# Patient Record
Sex: FEMALE | Race: WHITE | NOT HISPANIC OR LATINO | Employment: FULL TIME | URBAN - NONMETROPOLITAN AREA
[De-identification: names, ages, dates, MRNs, and addresses within clinical notes are randomized per-mention and may not be internally consistent; named-entity substitution may affect disease eponyms.]

---

## 2018-03-29 ENCOUNTER — OFFICE VISIT CONVERTED (OUTPATIENT)
Dept: FAMILY MEDICINE CLINIC | Age: 28
End: 2018-03-29
Attending: NURSE PRACTITIONER

## 2019-08-08 ENCOUNTER — OFFICE VISIT CONVERTED (OUTPATIENT)
Dept: FAMILY MEDICINE CLINIC | Age: 29
End: 2019-08-08
Attending: NURSE PRACTITIONER

## 2020-06-16 ENCOUNTER — HOSPITAL ENCOUNTER (OUTPATIENT)
Dept: OTHER | Facility: HOSPITAL | Age: 30
Discharge: HOME | End: 2020-06-16
Attending: NURSE PRACTITIONER

## 2020-06-16 ENCOUNTER — OFFICE VISIT CONVERTED (OUTPATIENT)
Dept: FAMILY MEDICINE CLINIC | Age: 30
End: 2020-06-16
Attending: NURSE PRACTITIONER

## 2020-06-18 LAB
AMOXICILLIN+CLAV SUSC ISLT: 4
AMPICILLIN SUSC ISLT: 8
AMPICILLIN+SULBAC SUSC ISLT: 4
BACTERIA UR CULT: ABNORMAL
CEFAZOLIN SUSC ISLT: <=4
CEFEPIME SUSC ISLT: <=1
CEFTAZIDIME SUSC ISLT: <=1
CEFTRIAXONE SUSC ISLT: <=1
CEFUROXIME ORAL SUSC ISLT: 8
CEFUROXIME PARENTER SUSC ISLT: 8
CIPROFLOXACIN SUSC ISLT: <=0.25
ERTAPENEM SUSC ISLT: <=0.5
GENTAMICIN SUSC ISLT: <=1
LEVOFLOXACIN SUSC ISLT: <=0.12
NITROFURANTOIN SUSC ISLT: <=16
TETRACYCLINE SUSC ISLT: 2
TMP SMX SUSC ISLT: <=20
TOBRAMYCIN SUSC ISLT: <=1

## 2020-07-07 ENCOUNTER — OFFICE VISIT CONVERTED (OUTPATIENT)
Dept: SURGERY | Facility: CLINIC | Age: 30
End: 2020-07-07
Attending: SURGERY

## 2020-07-15 ENCOUNTER — OFFICE VISIT CONVERTED (OUTPATIENT)
Dept: FAMILY MEDICINE CLINIC | Age: 30
End: 2020-07-15
Attending: NURSE PRACTITIONER

## 2020-08-07 ENCOUNTER — HOSPITAL ENCOUNTER (OUTPATIENT)
Dept: OTHER | Facility: HOSPITAL | Age: 30
Discharge: HOME | End: 2020-08-07
Attending: NURSE PRACTITIONER

## 2020-08-07 ENCOUNTER — OFFICE VISIT CONVERTED (OUTPATIENT)
Dept: FAMILY MEDICINE CLINIC | Age: 30
End: 2020-08-07
Attending: NURSE PRACTITIONER

## 2020-08-10 LAB — SARS-COV-2 RNA SPEC QL NAA+PROBE: NOT DETECTED

## 2020-12-04 ENCOUNTER — OFFICE VISIT CONVERTED (OUTPATIENT)
Dept: FAMILY MEDICINE CLINIC | Age: 30
End: 2020-12-04
Attending: NURSE PRACTITIONER

## 2021-05-10 NOTE — H&P
History and Physical      Patient Name: Marissa Brantley   Patient ID: [id number]   Sex: Female   YOB: 1990    Primary Care Provider: Jocy Raza MD   Referring Provider: Precious HUDDLESTON    Visit Date: July 7, 2020    Provider: Demar Ballard MD   Location: [facility]   Location Address: [address] [place] [address]  [id number]   Location Phone: [phone]          Chief Complaint  · Hemorrhoids      History Of Present Illness  Marissa Brantley is a 29 year old /White female who presents to the office today as a consult from Precious HUDDLESTON.      Patient schedule an appoint to see me regarding hemorrhoids.  The first time she ever had a problem with hemorrhoids was about a week ago when she had a swollen tender area at her anus that is since resolved.  A few days later she developed another tender swollen area at another area of her anus that is still painful but the pain is improving.  The only thing the patient has tried so far is Preparation H.       Past Medical History  Disease Name Date Onset Notes   Allergic rhinitis, chronic --  --    Arthritis --  --          Past Surgical History  Procedure Name Date Notes   Cesarian Section --  --    Cholecystectomy --  --    Wrist Surgery --  --          Medication List  Name Date Started Instructions   hydrocortisone 2.5 % topical cream with perineal applicator 07/07/2020 apply a thin layer to the affected area(s) by topical route 4 times per day for 14 days   lidocaine 5 % topical ointment 07/07/2020 apply to affected area(s) by topical route 1-4 times daily as needed for 14 days   Macrodantin 100 mg oral capsule  one po bid   pramoxine 1 % topical foam 07/07/2020 apply by external route 4 times a day for 14 days       Allergies Reconciled  Family Medical History  Disease Name Relative/Age Notes   Diabetes, unspecified type Father/   Father   Prostate cancer Grandfather (paternal)/   Grandfather (paternal)   Bladder  "calculus Sister/   Sister         Social History  Finding Status Start/Stop Quantity Notes   Alcohol Current some day --/-- --  drinks rarely   Caffeine Current some day --/-- --  drinks occasionally; tea and soft drinks   Second hand smoke exposure Current some day --/-- --  yes   Tobacco Current every day 15/-- --  09/12/2017 - current everyday smoker; started smoking at age 15; smoked 10 cigarette(s) per day         Review of Systems  · Constitutional  o Denies  o : fever, chills  · Cardiovascular  o Denies  o : chest pain on exertion  · Respiratory  o Denies  o : shortness of breath, cough  · Gastrointestinal  o Denies  o : nausea, vomiting      Vitals  Date Time BP Position Site L\R Cuff Size HR RR TEMP (F) WT  HT  BMI kg/m2 BSA m2 O2 Sat        07/07/2020 08:57 AM       14  215lbs 4oz 5'  5\" 35.82 2.12           Physical Examination  · Constitutional  o Appearance  o : healthy appearing, alert and in no acute distress, reliable historian  · Head and Face  o Head  o :   § Inspection  § : no visable deformities or lesions  · Eyes  o Conjunctivae  o : clear  o Sclerae  o : clear  · Neck  o Inspection/Palpation  o : normal appearance, no masses, trachea midline  · Respiratory  o Respiratory Effort  o : breathing unlabored, respiratory effort appears normal  o Inspection of Chest  o : normal appearance, no retractions  · Cardiovascular  o Heart  o : regular rate and rhythm  · Gastrointestinal  o Abdominal Examination  o :   § Abdomen  § : soft, nontender, nondistended  · Skin and Subcutaneous Tissue  o General Inspection  o : no visible concerning rashes or lesions present  · Neurologic  o Cranial Nerves  o : no obvious motor deficits  o Sensation  o : no obvious sensory deficits  o Gait and Station  o :   § Gait Screening  § : normal gait, able to stand without diffculty  o Cerebellar Function  o : no obvious abnormalities  · Psychiatric  o Judgement and Insight  o : judgment and insight intact  o Mood and " Affect  o : mood normal, affect appropriate     Anal exam performed with Em in the room as a chaperone.  There is a soft external hemorrhoid at the left lateral location and the hemorrhoid is only mildly tender.           Assessment  · External hemorrhoid     455.3/K64.4    Problems Reconciled  Plan  · Medications  o Medications have been Reconciled  o Transition of Care or Provider Policy  · Referrals  o ID: [id number] Date: 07/02/2020 Type: Inbound  Specialty: General Surgery     There is no indication at this time.  Will prescribe lidocaine ointment, hydrocortisone ointment, and pramoxine ointment.             Electronically Signed by: Demar Ballard MD -Author on July 7, 2020 10:01:18 AM

## 2021-05-15 VITALS — RESPIRATION RATE: 14 BRPM | BODY MASS INDEX: 35.86 KG/M2 | HEIGHT: 65 IN | WEIGHT: 215.25 LBS

## 2021-05-18 NOTE — PROGRESS NOTES
Marissa Brantley  1990     Office/Outpatient Visit    Visit Date: Wed, Jul 15, 2020 01:30 pm    Provider: Sintia Guerrero N.P. (Assistant: Keila Roberts MA)    Location: [facility]        Electronically signed by Sintia Guerrero N.P. on  07/15/2020 02:21:07 PM                             Subjective:        CC: Mrs. Brantley is a 29 year old White female.  back pain flare up; negative pregnancy test a week ago, sexually active with new  partner about a month         HPI:           Concerning low back pain, the discomfort is most prominent in the mid lumbar spine.  She characterizes it as constant and sharp.  This is a chronic, but intermittent problem with an acute exacerbation.  She states that the current episode of pain started yesterday.  The event which precipitated this pain was lifting her handicap daughter.  She denies radicular leg pain.  She notes some pain relief with back flexion, rest, and heat.  Muscle relaxers usually help.     ROS:     CONSTITUTIONAL:  Negative for fever.      RESPIRATORY:  Negative for recent cough and dyspnea.      GASTROINTESTINAL:  Negative for change in bowel control.      GENITOURINARY:  Positive for has OCP's but has not started her pills yet.   Negative for dysuria or change in bladder control.  had a pap last week at Dr. Crain's  office     MUSCULOSKELETAL:  Positive for cramps in foot.      NEUROLOGICAL:  Positive for tingling in left foot.          Past Medical History / Family History / Social History:         Last Reviewed on 7/15/2020 02:08 PM by Sintia Guerrero    Past Medical History:                 PAST MEDICAL HISTORY         Osteoarthritis: primarily affecting the right wrist post ORIF;     Chicken pox         GYNECOLOGICAL HISTORY:        Problems with menstrual cycles include irregular frequency/duration.      Current method of contraception is birth control pills;     Menarche occurred at age 14. Sexually Active? yes          CURRENT MEDICAL PROVIDERS:    Obstetrician/Gynecologist    Orthopedist: [facility]         PREVENTIVE HEALTH MAINTENANCE             PAP SMEAR: was last done 2016 with normal results         Surgical History:         Cholecystectomy: laparoscopic; 17; uncomplicated;      section: X 2;     Fracture Repair: right wrist; ;         Family History:     Father: Positive for Benign Prostatic Hypertrophy;     Mother:    Positive for Type 2 Diabetes;     Brother(s): Healthy; 1 brother(s) total     Sister(s): Healthy; 3 sister(s) total     Daughter(s): 2 daughter(s) total    ; Positive for congenital heart defect;         Social History:         Household:  Lives with her lives with significant other and 2 daughters.  Occupation: [employer]     Marital Status:      Children: 2 children         Tobacco/Alcohol/Supplements:     Last Reviewed on 7/15/2020 01:34 PM by Keila Roberts    Tobacco: Current Smoker: She currently smokes every day, 1 pack per day.  Non-drinker         Immunizations:     DTaP 1990    DTaP 1991    DTaP 1991    DTaP 3/5/1992    DTaP 1994    HIB-Mency 1991    HIB-Mency 1991    HIB-Mency 3/5/1992    Hep B (pedi/adol, 3-dose schedule) 2001    Hep B (pedi/adol, 3-dose schedule) 2002    Hep B (pedi/adol, 3-dose schedule) 2002    zzGardasil 2007    zzGardasil 2007    zzGardasil 3/5/2008    IPV  Poliovirus, inactivated 1990    IPV  Poliovirus, inactivated 1991    IPV  Poliovirus, inactivated 3/5/1992    IPV  Poliovirus, inactivated 1994    MMR  (Measles-Mumps-Rubella), live 3/5/1992    MMR  (Measles-Mumps-Rubella), live 2001    FluMist 2010    FluMist 2011    Menactra (Meningococcal MCV4P) 2007    Boostrix (Tdap) 2003        Allergies:     Last Reviewed on 7/15/2020 01:34 PM by Keila Roberts    Prozac:   (Adverse Reaction)        Current Medications:      Last Reviewed on 7/15/2020 01:34 PM by Keila Roberts    Preparation H 0.25-14-74.9 % Rectal Ointment [as directed]        Objective:        Vitals:         Current: 7/15/2020 1:36:13 PM    Ht:  5 ft, 5.375 in;  Wt: 209.4 lbs;  BMI: 34.4T: 97.4 F (oral);  BP: 107/69 mm Hg (right arm, sitting);  P: 100 bpm (right arm (BP Cuff), sitting);  sCr: 0.78 mg/dL;  GFR: 122.67        Exams:     PHYSICAL EXAM:     GENERAL: vital signs recorded - well developed, well nourished;  no apparent distress, seems to be in pain;     RESPIRATORY: normal respiratory rate and pattern with no distress; normal breath sounds with no rales, rhonchi, wheezes or rubs;     CARDIOVASCULAR: normal rate; rhythm is regular;  no systolic murmur; no edema;     MUSCULOSKELETAL: pain with range of motion in: back extension;  no CVA tenderness; SLR negative, equal strength and reflexes bilateral lower ext     PSYCHIATRIC: affect/demeanor: flat;         Assessment:         724.2   Low back pain   (Mild)     M54.5   Low back pain       K64.9   Unspecified hemorrhoids           ORDERS:         Meds Prescribed:       [New Rx] methocarbamoL 750 mg oral tablet [take 1 tablet (750 mg) by oral route 2 times per day prn ], #60 (sixty) tablets, Refills: 0 (zero)       [New Rx] etodolac 400 mg oral tablet [take 1 tablet (400 mg) by oral route 2 times per day with food], #60 (sixty) tablets, Refills: 0 (zero)                 Plan:         Low back pain        RECOMMENDATIONS given include: apply moist heat and off work yesterday and today.      FOLLOW-UP: Advised to call if there is no improvement in 1 week(s).            Prescriptions:       [New Rx] methocarbamoL 750 mg oral tablet [take 1 tablet (750 mg) by oral route 2 times per day prn ], #60 (sixty) tablets, Refills: 0 (zero)       [New Rx] etodolac 400 mg oral tablet [take 1 tablet (400 mg) by oral route 2 times per day with food], #60 (sixty) tablets, Refills: 0 (zero)         Unspecified  hemorrhoidssaw dr jeanette lópez recently  and he has treated her with different rx's for her hemorrhoid            Patient Recommendations:        For  Low back pain:    Try moist heat for pain relief. I also recommend off work yesterday and today.              Charge Capture:         Primary Diagnosis:     724.2  Low back pain           Orders:      70542  Office/outpatient visit; established patient, level 4  (In-House)              M54.5  Low back pain     K64.9  Unspecified hemorrhoids

## 2021-05-18 NOTE — PROGRESS NOTES
Marissa Brantley  1990     Office/Outpatient Visit    Visit Date: Fri, Dec 4, 2020 11:26 am    Provider: Carey Ayoub N.P. (Assistant: Trupti Ambriz MA)    Location: [facility]        Electronically signed by Carey Ayoub N.P. on  12/04/2020 12:21:56 PM                             Subjective:        CC: Mrs. Brantley is a 30 year old White female.  psoriasis; [phone] [employer] pt states she isnt taking etodolac         HPI: televideo visit          hx psoriasis for which triamcinolone cream usually helps but not currently effective.    psoriasis more of an issue since she had covid 19 sept 2020-  dx at fast pace.  diffuse psoriasis even of scalp and head and shoulders shampoo causes burning senation of scalp.  afebrile.            Concerning anxiety disorder, unspecified, her anxiety disorder was originally diagnosed more than 15 years ago.  Her symptom complex includes apprehension and insomnia.  True panic attacks apparently do not occur.  She is not currently being treated for anxiety.  Previous attempts at treatment have included paxil (worked best), zoloft, celexa.  would like to restart paxil.  lmp current.      ROS:     CONSTITUTIONAL:  Positive for fatigue.   Negative for chills or fever.      CARDIOVASCULAR:  Negative for chest pain, palpitations, tachycardia, orthopnea, and edema.      RESPIRATORY:  Negative for cough, dyspnea, and hemoptysis.      GASTROINTESTINAL:  Negative for abdominal pain, heartburn, constipation, diarrhea, and stool changes.      MUSCULOSKELETAL:  Negative for arthralgias, back pain, and myalgias.      INTEGUMENTARY/BREAST:  Positive for rash.      PSYCHIATRIC:  Positive for anxiety and insomnia.   Negative for suicidal thoughts.          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      Current method of contraception is birth control pills;     Menarche occurred at age 14. Sexually Active? yes         CURRENT MEDICAL PROVIDERS:    Obstetrician/Gynecologist    Orthopedist: [facility]         PREVENTIVE HEALTH MAINTENANCE             PAP SMEAR: was last done 2016 with normal results         Surgical History:         Cholecystectomy: laparoscopic; 17; uncomplicated;      section: X 2;     Fracture Repair: right wrist; ;         Family History:     Father: Positive for Benign Prostatic Hypertrophy;     Mother:    Positive for Type 2 Diabetes;     Brother(s): Healthy; 1 brother(s) total     Sister(s): Healthy; 3 sister(s) total     Daughter(s): 2 daughter(s) total    ; Positive for congenital heart defect;         Social History:         Household:  Lives with her lives with significant other and 2 daughters.  Occupation: [employer]     Marital Status:      Children: 2 children         Tobacco/Alcohol/Supplements:     Last Reviewed on 2020 11:27 AM by Trupti Ambriz    Tobacco: Current Smoker: She currently smokes every day, 1-5 cigarettes per day.  Non-drinker         Substance Abuse History:     Last Reviewed on 2020 01:33 PM by Yary Michaels        Marijuana: 'like once a week'         Mental Health History:     Last Reviewed on 2020 01:33 PM by Yary Michaels        Communicable Diseases (eg STDs):     Last Reviewed on 10/16/2017 03:47 PM by Zahira Wallace        Current Problems:     Last Reviewed on 2020 12:15 PM by Carey Ayoub    Other fatigue    Gastro-esophageal reflux disease without esophagitis    Unspecified hemorrhoids    Rash and other nonspecific skin eruption    Anxiety disorder, unspecified         Immunizations:     DTaP 1990    DTaP 2/20/1991    DTaP 6/19/1991    DTaP 3/5/1992    DTaP 8/23/1994    HIB-Mency 6/19/1991    HIB-Mency 9/4/1991    HIB-Mency 3/5/1992    Hep B (pedi/adol, 3-dose schedule) 7/24/2001    Hep B (pedi/adol, 3-dose schedule) 2/11/2002    Hep B (pedi/adol, 3-dose schedule) 7/30/2002    zzGardasil 9/26/2007    zzGardasil 11/28/2007    zzGardasil 3/5/2008    IPV  Poliovirus, inactivated 1990    IPV  Poliovirus, inactivated 2/20/1991    IPV  Poliovirus, inactivated 3/5/1992    IPV  Poliovirus, inactivated 8/23/1994    MMR  (Measles-Mumps-Rubella), live 3/5/1992    MMR  (Measles-Mumps-Rubella), live 7/24/2001    FluMist 12/1/2010    FluMist 12/7/2011    Menactra (Meningococcal MCV4P) 9/26/2007    Boostrix (Tdap) 2/26/2003        Allergies:     Last Reviewed on 12/04/2020 11:26 AM by Trupti Ambriz:   (Adverse Reaction)        Current Medications:     Last Reviewed on 12/04/2020 11:52 AM by Carey Ayoub    None        Objective:        Exams:     PHYSICAL EXAM:     GENERAL: no apparent distress;     BREAST/INTEGUMENT: a rash is noted left elbow;  the color is mainly red;  it is best characterized as scaling;     NEUROLOGIC: mental status: alert and oriented x 3; GROSSLY INTACT     PSYCHIATRIC:  appropriate affect and demeanor; normal speech pattern; grossly normal memory;         Assessment:         R21   Rash and other nonspecific skin eruption       F41.9   Anxiety disorder, unspecified           ORDERS:         Meds Prescribed:       [New Rx] Medrol (Kojo) 4 mg oral Tablet, Dose Pack [take by oral route as directed per package instructions], #21 (twenty one) tablets, Refills: 0 (zero)       [New Rx] PaxiL 10 mg oral tablet [take 1 tablet (10 mg) by oral route once daily], #90 (ninety) tablets, Refills: 1 (one)                 Plan:         Rash and other nonspecific skin eruption        REFERRALS:  Referral initiated to a dermatologist ( reported diffuse psoriasis  ).      continue topical triamcinolone cream that she has at home.  refer to dermatology due to reports of diffuse and chronic symptoms.  Telehealth: Verbal consent obtained for visit to occur via televideo conferencing           Prescriptions:       [New Rx] Medrol (Kojo) 4 mg oral Tablet, Dose Pack [take by oral route as directed per package instructions], #21 (twenty one) tablets, Refills: 0 (zero)         Anxiety disorder, unspecified        RECOMMENDATIONS given include: avoidance of caffeine, stress reduction, and consider counseling.  follow up if not improving..            Prescriptions:       [New Rx] PaxiL 10 mg oral tablet [take 1 tablet (10 mg) by oral route once daily], #90 (ninety) tablets, Refills: 1 (one)             Patient Recommendations:        For  Anxiety disorder, unspecified:    Try to avoid or reduce the amount of caffeine intake. Try stress reduction methods to reduce the frequency or lessen the severity of anxiety episodes.              Charge Capture:         Primary Diagnosis:     R21  Rash and other nonspecific skin eruption           Orders:      [id number]  Office/outpatient visit; established patient, level 3  (In-House)              F41.9  Anxiety disorder, unspecified

## 2021-05-18 NOTE — PROGRESS NOTES
Marissa Brantley 1990     Office/Outpatient Visit    Visit Date: Thu, Mar 29, 2018 03:31 pm    Provider: Carey Ayoub N.P. (Assistant: Radha Cantu MA)    Location: [facility]        Electronically signed by Carey Ayoub N.P. on  03/29/2018 10:29:24 PM                             SUBJECTIVE:        CC:     Ms. Brantley is a 27 year old White female.  Skin Condition,Chest congestion, and spot on chest;         HPI:         URI noted.  These have been present for the past 4 weeks.  The symptoms include progressive, dry cough, nasal congestion, watery nasal discharge and wheezing.  She denies fever.  She denies exposure to ill contacts.  She has already tried to relieve the symptoms with cough medication.          Concerning rash, this has been a problem for the past year.  The rash has occurred previously, but was not diagnosed.  It is located primarily upper extremities and the legs.  She describes the rash as red, rough in texture, scaly, and distributed in irregularly shaped patches.  The rash is moderately pruritic.  She denies any associated symptoms.  No contributing factors are identified.  uses dove soap, olay lotion and showers q other day.  some days feels facial flushing.          With regard to the abdominal pain, unspecified, there are no associated symptoms.  feels tender knot right upper abdomen near scar where gallbladder removed x 3 days.      ROS:     CONSTITUTIONAL:  Negative for chills, fatigue, fever, and weight change.      E/N/T:  Positive for nasal congestion and frequent rhinorrhea.   Negative for sore throat.      CARDIOVASCULAR:  Negative for chest pain, palpitations, tachycardia, orthopnea, and edema.      RESPIRATORY:  Positive for recent cough and frequent wheezing.      GASTROINTESTINAL:  Positive for abdominal pain ( RUQ ).   Negative for acid reflux symptoms, constipation, diarrhea, heartburn, nausea or vomiting.      MUSCULOSKELETAL:  Positive for back pain (  chronic ).      INTEGUMENTARY/BREAST:  Positive for rash.      NEUROLOGICAL:  Negative for dizziness, headaches, paresthesias, and weakness.      ENDOCRINE:  Negative for hair loss, heat/cold intolerance, polydipsia, and polyphagia.      PSYCHIATRIC:  Negative for anxiety, depression, and sleep disturbances.          PMH/FMH/SH:     Last Reviewed on 10/16/2017 03:47 PM by Zahira Wallace    Past Medical History:                 PAST MEDICAL HISTORY         Osteoarthritis: primarily affecting the right wrist post ORIF;     Chicken pox         GYNECOLOGICAL HISTORY:        Problems with menstrual cycles include irregular frequency/duration.      Current method of contraception is Implanon;     Menarche occurred at age 14. Sexually Active? yes         CURRENT MEDICAL PROVIDERS:    Obstetrician/Gynecologist    Orthopedist: nel armijo         PREVENTIVE HEALTH MAINTENANCE             PAP SMEAR: was last done 2016 with normal results         Surgical History:         Cholecystectomy: laparoscopic; 17; uncomplicated;       section: X 2;     Fracture Repair: right wrist; ;         Family History:     Father: Positive for Benign Prostatic Hypertrophy;     Mother:    Positive for Type 2 Diabetes;     Brother(s): Healthy; 1 brother(s) total     Sister(s): Healthy; 3 sister(s) total     Daughter(s): 2 daughter(s) total    ; Positive for congenital heart defect;         Social History:         Household:  Lives with her lives with significant other and 2 daughters.  Occupation: Homemaker     Marital Status: Single     Children: 2 children         Tobacco/Alcohol/Supplements:     Last Reviewed on 10/16/2017 03:47 PM by Zahira Wallace    Tobacco: Current Smoker: She currently smokes every day, 1-5 cigarettes per day.  Non-drinker         Substance Abuse History:     Last Reviewed on 10/16/2017 03:47 PM by Zahira Wallace        Marijuana: 'like once a week'         Mental Health  History:     Last Reviewed on 10/16/2017 03:47 PM by Zahira Wallace        Communicable Diseases (eg STDs):     Last Reviewed on 10/16/2017 03:47 PM by Zahira Wallace            Current Problems:     Last Reviewed on 10/16/2017 03:47 PM by Zahira Wallace    GERD     Fatigue     facial droop     Migraine without aura, without mention of intractable migraine without mention of status migrainosus     Low back pain     Peritonsillar abscess     Leukocytosis, unspecified     Constipation     Depression with anxiety     Secondary amenorrhea     Memory loss     ADD     Chronic tonsillar enlargement         Immunizations:     DTaP 1990     DTaP 2/20/1991     DTaP 6/19/1991     DTaP 3/5/1992     DTaP 8/23/1994     HIB-Mency 6/19/1991     HIB-Mency 9/4/1991     HIB-Mency 3/5/1992     Hep B (pedi/adol, 3-dose schedule) 7/24/2001     Hep B (pedi/adol, 3-dose schedule) 2/11/2002     Hep B (pedi/adol, 3-dose schedule) 7/30/2002     zzGardasil 9/26/2007     zzGardasil 11/28/2007     zzGardasil 3/5/2008     IPV  Poliovirus, inactivated 1990     IPV  Poliovirus, inactivated 2/20/1991     IPV  Poliovirus, inactivated 3/5/1992     IPV  Poliovirus, inactivated 8/23/1994     MMR  (Measles-Mumps-Rubella), live 3/5/1992     MMR  (Measles-Mumps-Rubella), live 7/24/2001     FluMist 12/1/2010     FluMist 12/7/2011     Menactra (Meningococcal MCV4P) 9/26/2007     Boostrix (Tdap) 2/26/2003         Allergies:     Last Reviewed on 10/16/2017 03:47 PM by Zahira Wallace      No Known Drug Allergies.     Prozac: (Adverse Reaction)        Current Medications:     Last Reviewed on 10/16/2017 03:47 PM by Zahira Wallace    Ventolin HFA 90mcg/1actuation Oral Inhaler 1 puff every 4-6 hours prn SOB/cough *may substitute for insurance preference*     Promethazine HCl 12.5mg Tablet 1 tab tid prn         OBJECTIVE:        Vitals:         Historical:     10/16/2017  BP:   120/78 mm Hg ( (left arm, , sitting, );)     10/16/2017   Wt:   245.5lbs        Current: 3/29/2018 3:34:57 PM    Ht:  5 ft, 5.375 in;  Wt: 250.6 lbs;  BMI: 41.2    T: 97.7 F (oral);  BP: 128/80 mm Hg (left arm, sitting);  P: 90 bpm (finger clip, sitting);  sCr: 0.78 mg/dL;  GFR: 134.73        Exams:     PHYSICAL EXAM:     GENERAL: obese;  no apparent distress;     E/N/T: EARS: both TMs are have fluid behind them;  NOSE: nasal mucosa is erythematous;  OROPHARYNX: posterior pharynx, including tonsils, tongue, and uvula are normal;     RESPIRATORY: normal respiratory rate and pattern with no distress; normal breath sounds with no rales, rhonchi, wheezes or rubs; bronchospasmodic cough with deep inspiration;     CARDIOVASCULAR: normal rate; rhythm is regular;     GASTROINTESTINAL: mild RUQ tenderness;  no guarding;  no rebound tenderness;     LYMPHATIC: no enlargement of cervical or facial nodes;     MUSCULOSKELETAL:  Normal range of motion, strength and tone;     NEUROLOGIC: mental status: alert and oriented x 3; GROSSLY INTACT     PSYCHIATRIC:  appropriate affect and demeanor; normal speech pattern; grossly normal memory;         ASSESSMENT           465.8   J06.9  URI              DDx:     782.1   R21  Rash              DDx:     789.00   E13.39  Abdominal pain, unspecified              DDx:         ORDERS:         Meds Prescribed:       Claritin (Loratadine) 10mg Tablet 1 tab daily  #30 (Thirty) tablet(s) Refills: 0       Prednisone 5mg Tablet 8 pills day 1, 6 pills day 2, 4 pills on day three, 2 pills on day 4, and 1 pill on day 5  #21 (Twenty One) tablet(s) Refills: 0       Triamcinolone Acetonide 0.1% Cream apply bid to affected area do not apply to face  #30 (Thirty) gm Refills: 0       Promethazine with Dextromethrophan 6.25mg/15mg per 5ml Syrup 1 to 2 tsp q hs prn cough  #4 (Four) oz Refills: 0       Refill of: Ventolin HFA (Albuterol) 90mcg/1actuation Oral Inhaler 1 puff every 4-6 hours prn SOB/cough *may substitute for insurance preference*  #1 (One) inhaler(s)  "Refills: 1         Radiology/Test Orders:       [id number]  Ultrasound Right Upper Quadrant abdomen limited  (Send-Out)           Lab Orders:       [id number]  [facility] Arthritis Profile  (Send-Out)                   PLAN:          URI         RECOMMENDATIONS given include: rest, increase oral fluid intake, and promethazine dm may cause drowsiness.  follow up if not improving..            Prescriptions:       Claritin (Loratadine) 10mg Tablet 1 tab daily  #30 (Thirty) tablet(s) Refills: 0       Promethazine with Dextromethrophan 6.25mg/15mg per 5ml Syrup 1 to 2 tsp q hs prn cough  #4 (Four) oz Refills: 0       Refill of: Ventolin HFA (Albuterol) 90mcg/1actuation Oral Inhaler 1 puff every 4-6 hours prn SOB/cough *may substitute for insurance preference*  #1 (One) inhaler(s) Refills: 1          Rash     LABORATORY:  Labs ordered to be performed today include Arthritis Profile.      RECOMMENDATIONS given include: avoid irritants (such as wool clothing, synthetics, etc.), allergan avoidance, and consider referral to dermatology.  no definitive joint pain but rash has some psoriatic appearance..            Prescriptions:       Prednisone 5mg Tablet 8 pills day 1, 6 pills day 2, 4 pills on day three, 2 pills on day 4, and 1 pill on day 5  #21 (Twenty One) tablet(s) Refills: 0       Triamcinolone Acetonide 0.1% Cream apply bid to affected area do not apply to face  #30 (Thirty) gm Refills: 0           Orders:       [id number]  [facility] Arthritis Profile  (Send-Out)            Abdominal pain, unspecified         RADIOLOGY:  I have ordered Abdominal Ultrasound Limited Right Upper Quadrant to be done today.            Orders:       [id number]  Ultrasound Right Upper Quadrant abdomen limited  (Send-Out)               Patient Recommendations:        For  Rash:     I also recommend ^.      Avoid irritants such as wool clothing or synthetics; some \"no iron\" garments contain formaldehyde residue which may aggravate skin irritation. The most " crucial key to preventing serious symptoms (or even mild symptoms) is to completely avoid the substance to which Ms. Brantley is allergic.  She should not eat, touch, or otherwise be exposed.  Some children can react even with smell.              CHARGE CAPTURE           **Please note: ICD descriptions below are intended for billing purposes only and may not represent clinical diagnoses**        Primary Diagnosis:         465.8 URI            J06.9    Acute upper respiratory infection, unspecified              Orders:          [id number]   Office/outpatient visit; established patient, level 4  (In-House)           782.1 Rash            R21    Rash and other nonspecific skin eruption    789.00 Abdominal pain, unspecified            E13.39    Other specified diabetes mellitus with other diabetic ophthalmic complication

## 2021-05-18 NOTE — PROGRESS NOTES
Marissa Brantley  1990     Office/Outpatient Visit    Visit Date:  01:57 pm    Provider: Precious Boudreaux N.P. (Assistant: Trupti Ambriz MA)    Location: [facility]        Electronically signed by Precious Boudreaux N.P. on  2020 11:26:17 PM                             Subjective:        CC: Mrs. Brantley is a 29 year old White female.  dysuria, wants to talk about being put on birth control;         HPI:           Complaint of postcoital and contact bleeding..  The symptom began 3 days ago.  Started bleeding a day or so after intercourse with a new partner - no protection;  no itching odor/pelvic pain;  bleeding since intercourse burning with urination.  She is scheduled for a pap next week    ROS:     CONSTITUTIONAL:  Negative for chills, fatigue and fever.      CARDIOVASCULAR:  Negative for chest pain and pedal edema.      RESPIRATORY:  Negative for recent cough and dyspnea.      GENITOURINARY:  Positive for dysuria and post-coital vaginal bleeding (has improved since her intercourse - does not want a pelvic exam today - pap scheduled for next week).   Negative for vaginal discharge, vaginal itching or change in urine stream.      MUSCULOSKELETAL:  Negative for arthralgias and myalgias.          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      Current method of contraception is Implanon;     Menarche occurred at age 14. Sexually Active? yes         CURRENT MEDICAL PROVIDERS:    Obstetrician/Gynecologist    [facility]             PAP SMEAR: was last done 2016 with  normal results         Surgical History:         Cholecystectomy: laparoscopic; 17; uncomplicated;      section: X 2;     Fracture Repair: right wrist; ;         Family History:     Father: Positive for Benign Prostatic Hypertrophy;     Mother:    Positive for Type 2 Diabetes;     Brother(s): Healthy; 1 brother(s) total     Sister(s): Healthy; 3 sister(s) total     Daughter(s): 2 daughter(s) total    ; Positive for congenital heart defect;         Social History:         Household:  Lives with her lives with significant other and 2 daughters.  Occupation: Homemaker     Marital Status: Single     Children: 2 children         Tobacco/Alcohol/Supplements:     Last Reviewed on 2019 11:03 AM by Juana Stevenson    Tobacco: Current Smoker: She currently smokes every day, 1/2 pack per day.  Non-drinker         Substance Abuse History:     Last Reviewed on 10/16/2017 03:47 PM by Zahira Wallace        Marijuana: 'like once a week'         Mental Health History:     Last Reviewed on 10/16/2017 03:47 PM by Zahira Wallace        Communicable Diseases (eg STDs):     Last Reviewed on 10/16/2017 03:47 PM by Zahira Wallace        Current Problems:     Last Reviewed on 10/16/2017 03:47 PM by Zahira Wallace    ADD    Chronic tonsillar enlargement    Memory loss    Secondary amenorrhea    Fatigue    Depression with anxiety    GERD    Constipation    Leukocytosis, unspecified    Peritonsillar abscess    Low back pain    Migraine without aura, without mention of intractable migraine without mention of status migrainosus    Migraine without aura, not intractable, without status migrainosus    facial droop    Facial weakness    Other fatigue    Gastro-esophageal reflux disease without esophagitis    Urinary calculus, unspecified    Kidney stone        Immunizations:     DTaP 1990    DTaP 1991    DTaP 1991    DTaP 3/5/1992    DTaP 1994    HIB-Mency 1991    HIB-Mency  9/4/1991    HIB-Mency 3/5/1992    Hep B (pedi/adol, 3-dose schedule) 7/24/2001    Hep B (pedi/adol, 3-dose schedule) 2/11/2002    Hep B (pedi/adol, 3-dose schedule) 7/30/2002    zzGardasil 9/26/2007    zzGardasil 11/28/2007    zzGardasil 3/5/2008    IPV  Poliovirus, inactivated 1990    IPV  Poliovirus, inactivated 2/20/1991    IPV  Poliovirus, inactivated 3/5/1992    IPV  Poliovirus, inactivated 8/23/1994    MMR  (Measles-Mumps-Rubella), live 3/5/1992    MMR  (Measles-Mumps-Rubella), live 7/24/2001    FluMist 12/1/2010    FluMist 12/7/2011    Menactra (Meningococcal MCV4P) 9/26/2007    Boostrix (Tdap) 2/26/2003        Allergies:     Last Reviewed on 6/16/2020 01:57 PM by Trupti Ambriz    No Known Allergies.    Prozac:   (Adverse Reaction)        Current Medications:     Last Reviewed on 6/16/2020 01:57 PM by Trupti Ambriz    Promethazine HCl 25mg Tablet [ 1 tab q 6 hours prn ]    Tamsulosin HCl 0.4mg Capsules [1 tab daily]    sulfameth/trimeththoprim 800/160mg        Objective:        Vitals:         Current: 6/16/2020 2:04:20 PM    Ht:  5 ft, 5.375 in;  Wt: 226.2 lbs;  BMI: 37.2T: 96.7 F (oral);  BP: 123/84 mm Hg (left arm, sitting);  P: 65 bpm (left arm (BP Cuff), sitting);  sCr: 0.78 mg/dL;  GFR: 126.76        Exams:     PHYSICAL EXAM:     GENERAL: vital signs recorded - well developed, well nourished;  no apparent distress;     RESPIRATORY: normal appearance and symmetric expansion of chest wall; normal respiratory rate and pattern with no distress; normal breath sounds with no rales, rhonchi, wheezes or rubs;     CARDIOVASCULAR: normal rate; rhythm is regular;  no edema;     GASTROINTESTINAL: nontender; normal bowel sounds; rectal exam: external hemorrhoid present at 9 o'clock;     MUSCULOSKELETAL: normal gait; normal range of motion of all major muscle groups; no limb or joint pain with range of motion;     NEUROLOGIC: mental status: alert and oriented x 3;     PSYCHIATRIC: appropriate affect and  demeanor; normal speech pattern; normal thought and perception;         Lab/Test Results:         Glucose, Urine: Neg (06/16/2020),     Bilirubin, urine: Small (06/16/2020),     Ketones, Urine Strip: Negative (06/16/2020),     Specific Gravity, urine: 1.030 (06/16/2020),     Blood in Urine: trace (06/16/2020),     pH, urine: 6.0 (06/16/2020),     Protein Urine QL: 30 mg (06/16/2020),     Urobilinogen, urine: 0.2 E.U./dL (06/16/2020),     Nitrite, Urine: Negative (06/16/2020),     Leukoctyes, urine: Trace (06/16/2020),     Appearance: Clear (06/16/2020),     collection source: Clean-catch (06/16/2020),     Color: Yellow (06/16/2020),     Performed by:: ael (06/16/2020),     Pregnancy Test, Urine: neg (06/16/2020),             Assessment:         N39.0   Urinary tract infection, site not specified       Z30.011   Encounter for initial prescription of contraceptive pills       N93.0   Postcoital and contact bleeding       K64.9   Unspecified hemorrhoids           ORDERS:         Meds Prescribed:       [New Rx] Macrobid 100 mg oral capsule [take 1 capsule (100 mg) by oral route 2 times per day with food x 5 days], #10 (ten) capsules, Refills: 0 (zero)       [New Rx] Preparation H 0.25-14-74.9 % Rectal Ointment [as directed], #28 (twenty eight) grams, Refills: 0 (zero)         Lab Orders:       [id number]  Urinalysis, automated, without microscopy  (In-House)            49782  [facility] HCG Pregnancy Urine Qualitative  (In-House)            37142  Culture, bacterial; with isolation and presumptive identification of each isolate, urine  (Send-Out)                      Plan:         Urinary tract infection, site not specified          Prescriptions:       [New Rx] Macrobid 100 mg oral capsule [take 1 capsule (100 mg) by oral route 2 times per day with food x 5 days], #10 (ten) capsules, Refills: 0 (zero)           Orders:       [id number]  Urinalysis, automated, without microscopy  (In-House)            [id number]  Culture, bacterial;  with isolation and presumptive identification of each isolate, urine  (Send-Out)              Encounter for initial prescription of contraceptive pillsGiven that she is a smoker, has a history of Migraines - I am deferring the contraceptives to the GYN - She will have a pap next week and should discuss options with them    LABORATORY:  Labs ordered to be performed today include urine pregnancy test.            Orders:       [id number]  [facility] HCG Pregnancy Urine Qualitative  (In-House)              Postcoital and contact bleedingdeclines vaginal exam - this may be tears in the vaginal wall.  Less concerning since she reports today that it is improving since intercourse.  i would recommend that she avoid any further intercourse until her pap next week.  RTO if worsening, if abdominal pain  ER precautions given        Unspecified hemorrhoidsShe may need the hemorrhoid lanced.  I will have her sit in warm bath/sitz bath and follow up if does not improve           Prescriptions:       [New Rx] Preparation H 0.25-14-74.9 % Rectal Ointment [as directed], #28 (twenty eight) grams, Refills: 0 (zero)             Charge Capture:         Primary Diagnosis:     N39.0  Urinary tract infection, site not specified           Orders:      [id number]  Office/outpatient visit; established patient, level 3  (In-House)            06101  Urinalysis, automated, without microscopy  (In-House)              Z30.011  Encounter for initial prescription of contraceptive pills           Orders:      [id number]  [facility] HCG Pregnancy Urine Qualitative  (In-House)              N93.0  Postcoital and contact bleeding     K64.9  Unspecified hemorrhoids

## 2021-05-18 NOTE — PROGRESS NOTES
"Marissa Brantley  1990     Office/Outpatient Visit    Visit Date: Fri, Aug 7, 2020 01:26 pm    Provider: Yary Michaels N.P. (Assistant: Christine Martino MA)    Location: [facility]        Electronically signed by Yary Michaels N.P. on  2020 02:54:56 PM                             Subjective:        CC: Mrs. Brantley is a 29 year old White female.  Neausea and vomiting;         HPI: 29-year-old female presenting to clinic for 2-day history of nausea and vomiting.  She states that nausea and vomiting started this morning.  She is very fatigued and is not able to go to work.  Patient went to [facility] ED and was given prescription for Phenergan and given a work note for last night.  She is wanting another work note.    ROS:     CONSTITUTIONAL:  Negative for chills, fatigue and fever.      CARDIOVASCULAR:  Negative for chest pain and palpitations.      RESPIRATORY:  Negative for recent cough and dyspnea.      GASTROINTESTINAL:  Positive for abdominal pain ( diffuse; \"Cramping\" ) and diarrhea ( \"Improving\" ).   Negative for constipation, nausea or vomiting.      GENITOURINARY:  Negative for dysuria.      MUSCULOSKELETAL:  Negative for myalgias.      INTEGUMENTARY/BREAST:  Negative for rash.      NEUROLOGICAL:  Negative for dizziness, paresthesias and weakness.      PSYCHIATRIC:  Negative for anxiety, depression, sleep disturbance and suicidal thoughts.          Past Medical History / Family History / Social History:         Last Reviewed on 2020 01:33 PM by Yary Michaels    Past Medical History:                 PAST MEDICAL HISTORY         Osteoarthritis: primarily affecting the right wrist post ORIF;     Chicken pox         GYNECOLOGICAL HISTORY:        Problems with menstrual cycles include irregular frequency/duration.      Current method of contraception is birth control pills;     Menarche occurred at age 14. Sexually Active? yes         CURRENT " MEDICAL PROVIDERS:    Obstetrician/Gynecologist    Orthopedist: nel armijo         PREVENTIVE HEALTH MAINTENANCE             PAP SMEAR: was last done 2016 with normal results         Surgical History:         Cholecystectomy: laparoscopic; 17; uncomplicated;      section: X 2;     Fracture Repair: right wrist; ;         Family History:     Father: Positive for Benign Prostatic Hypertrophy;     Mother:    Positive for Type 2 Diabetes;     Brother(s): Healthy; 1 brother(s) total     Sister(s): Healthy; 3 sister(s) total     Daughter(s): 2 daughter(s) total    ; Positive for congenital heart defect;         Social History:         Household:  Lives with her lives with significant other and 2 daughters.  Occupation: [employer]     Marital Status:      Children: 2 children         Tobacco/Alcohol/Supplements:     Last Reviewed on 2020 01:33 PM by Yary Michaels    Tobacco: Current Smoker: She currently smokes every day, 1 pack per day.  Non-drinker         Substance Abuse History:     Last Reviewed on 2020 01:33 PM by Yary Michaels        Marijuana: 'like once a week'         Mental Health History:     Last Reviewed on 2020 01:33 PM by Yary Michaels        Communicable Diseases (eg STDs):     Last Reviewed on 10/16/2017 03:47 PM by Zahira Wallace        Immunizations:     DTaP 1990    DTaP 1991    DTaP 1991    DTaP 3/5/1992    DTaP 1994    HIB-Mency 1991    HIB-Mency 1991    HIB-Mency 3/5/1992    Hep B (pedi/adol, 3-dose schedule) 2001    Hep B (pedi/adol, 3-dose schedule) 2002    Hep B (pedi/adol, 3-dose schedule) 2002    zzCristhiandalouise 2007    zzCristhiandalouise 2007    zzCristhiandasil 3/5/2008    IPV  Poliovirus, inactivated 1990    IPV  Poliovirus, inactivated 1991    IPV  Poliovirus, inactivated 3/5/1992    IPV  Poliovirus, inactivated 1994    MMR  (Measles-Mumps-Rubella), live 3/5/1992     MMR  (Measles-Mumps-Rubella), live 7/24/2001    FluMist 12/1/2010    FluMist 12/7/2011    Menactra (Meningococcal MCV4P) 9/26/2007    Boostrix (Tdap) 2/26/2003        Allergies:     Last Reviewed on 8/07/2020 01:33 PM by Yary Michaels    Prozac:   (Adverse Reaction)        Current Medications:     Last Reviewed on 8/07/2020 01:33 PM by Yary Michaels    None        Objective:        Vitals:         Current: 8/7/2020 1:29:02 PM    Ht:  5 ft, 5.375 in;  Wt: 206.6 lbs;  BMI: 34.0T: 98.2 F (temporal);  BP: 136/106 mm Hg (right arm, sitting);  P: 102 bpm (right arm (BP Cuff), sitting);  sCr: 0.78 mg/dL;  GFR: 121.97        Repeat:     2:52:17 PM  BP:   134/92mm Hg    Exams:     PHYSICAL EXAM:     GENERAL: vital signs recorded - well developed, well nourished;  well groomed;  no apparent distress;     EYES: extraocular movements intact; conjunctiva and cornea are normal; PERRLA;     RESPIRATORY: normal respiratory rate and pattern with no distress; normal breath sounds with no rales, rhonchi, wheezes or rubs;     CARDIOVASCULAR: normal rate; rhythm is regular;  no systolic murmur; no edema;     GASTROINTESTINAL: nontender; normal bowel sounds;     MUSCULOSKELETAL: normal gait; normal overall tone     NEUROLOGIC: mental status: alert and oriented x 3;         Assessment:         R11.0   Nausea           Plan:         NauseaPatient will be notified of COVID results.  Patient will likely be notified on Monday or Tuesday.  Do not return to work until told results. Wash hands frequently and self quarantine. Increase fluid intake and rest.  [place] diet and advance as tolerated.  Take Phenergan as prescribed from the ED.  Notify clinic with any acute concerns or issues.  Patient declines any medication for cramping.  She verbalized understanding has no further questions upon discharge.            Charge Capture:         Primary Diagnosis:     R11.0  Nausea           Orders:      14481  Office/outpatient visit; established  patient, level 3  (In-House)

## 2021-05-18 NOTE — PROGRESS NOTES
Marissa Brantley 1990     Office/Outpatient Visit    Visit Date: Thu, Aug 8, 2019 11:01 am    Provider: Carey Ayoub N.P. (Assistant: Juana Stevenson MA)    Location: [facility]        Electronically signed by Carey Ayoub N.P. on  08/09/2019 09:38:50 AM                             SUBJECTIVE:        CC:     Mrs. Brantley is a 28 year old White female.  She is here today following a transition of care from the emergency department ( [facility] for Kidney stones ).  (called for records, printing CT to be reviewed)         HPI: lmp 3 wks ago         Patient complains of kidney stone.  [facility] ER aug 6 dx with kidney stone.  rec'd hydrocodone, promethazine, bactrim and flomax.  states she is screaming and crying with pain and phrmacist told her she needed to come in right away because she was not going to have enough pain medication from the ER.  she states the only regimen that resolved her pain at the ER was hydrocodone with tramadol 1 hr later.  this is first kidney stone.  ct report from [facility] notes 3 mm right partially obstructing stone. has never seen urology.      ROS:     CONSTITUTIONAL:  Positive for fatigue.   Negative for chills or fever.      CARDIOVASCULAR:  Negative for chest pain, palpitations, tachycardia, orthopnea, and edema.      RESPIRATORY:  Negative for cough, dyspnea, and hemoptysis.      GASTROINTESTINAL:  Negative for abdominal pain, heartburn, constipation, diarrhea, and stool changes.      GENITOURINARY:  Negative for genital lesions, hematuria, menstrual problems, polyuria, abnormal vaginal bleeding, and vaginal discharge.      MUSCULOSKELETAL:  Positive for back pain ( acute ).      NEUROLOGICAL:  Negative for dizziness, headaches, paresthesias, and weakness.      PSYCHIATRIC:  Negative for anxiety, depression, and sleep disturbances.          PMH/FMH/SH:     Last Reviewed on 8/08/2019 11:23 AM by Carey Ayoub    Past Medical History:                 PAST MEDICAL  HISTORY         Osteoarthritis: primarily affecting the right wrist post ORIF;     Chicken pox         GYNECOLOGICAL HISTORY:        Problems with menstrual cycles include irregular frequency/duration.      Current method of contraception is Implanon;     Menarche occurred at age 14. Sexually Active? yes         CURRENT MEDICAL PROVIDERS:    Obstetrician/Gynecologist    Orthopedist: nel armijo         PREVENTIVE HEALTH MAINTENANCE             PAP SMEAR: was last done 2016 with normal results         Surgical History:         Cholecystectomy: laparoscopic; 17; uncomplicated;       section: X 2;     Fracture Repair: right wrist; ;         Family History:     Father: Positive for Benign Prostatic Hypertrophy;     Mother:    Positive for Type 2 Diabetes;     Brother(s): Healthy; 1 brother(s) total     Sister(s): Healthy; 3 sister(s) total     Daughter(s): 2 daughter(s) total    ; Positive for congenital heart defect;         Social History:         Household:  Lives with her lives with significant other and 2 daughters.  Occupation: Homemaker     Marital Status: Single     Children: 2 children         Tobacco/Alcohol/Supplements:     Last Reviewed on 2019 11:03 AM by Juana Stevenson    Tobacco: Current Smoker: She currently smokes every day, 1/2 pack per day.  Non-drinker         Substance Abuse History:     Last Reviewed on 10/16/2017 03:47 PM by Zahira Wallace        Marijuana: 'like once a week'         Mental Health History:     Last Reviewed on 10/16/2017 03:47 PM by Zahira Wallace        Communicable Diseases (eg STDs):     Last Reviewed on 10/16/2017 03:47 PM by Zahira Wallace            Current Problems:     Last Reviewed on 10/16/2017 03:47 PM by Zahira Wallace    GERD     Fatigue     facial droop     Migraine without aura, without mention of intractable migraine without mention of status migrainosus     Low back pain     Peritonsillar abscess      Leukocytosis, unspecified     Constipation     Depression with anxiety     Secondary amenorrhea     Memory loss     ADD     Chronic tonsillar enlargement         Immunizations:     DTaP 1990     DTaP 2/20/1991     DTaP 6/19/1991     DTaP 3/5/1992     DTaP 8/23/1994     HIB-Mency 6/19/1991     HIB-Mency 9/4/1991     HIB-Mency 3/5/1992     Hep B (pedi/adol, 3-dose schedule) 7/24/2001     Hep B (pedi/adol, 3-dose schedule) 2/11/2002     Hep B (pedi/adol, 3-dose schedule) 7/30/2002     zzGardasil 9/26/2007     zzGardasil 11/28/2007     zzGardasil 3/5/2008     IPV  Poliovirus, inactivated 1990     IPV  Poliovirus, inactivated 2/20/1991     IPV  Poliovirus, inactivated 3/5/1992     IPV  Poliovirus, inactivated 8/23/1994     MMR  (Measles-Mumps-Rubella), live 3/5/1992     MMR  (Measles-Mumps-Rubella), live 7/24/2001     FluMist 12/1/2010     FluMist 12/7/2011     Menactra (Meningococcal MCV4P) 9/26/2007     Boostrix (Tdap) 2/26/2003         Allergies:     Last Reviewed on 3/29/2018 03:35 PM by Radha Cantu      No Known Drug Allergies.     Prozac: (Adverse Reaction)        Current Medications:     Last Reviewed on 8/08/2019 11:07 AM by Juana Stevenson    Hydrocodone/Acetaminophen 5mg/325mg Tablet 1 every 6 hours prn pain     Promethazine HCl 25mg Tablet 1 tab q 6 hours prn     sulfameth/trimeththoprim 800/160mg     Tamsulosin HCl 0.4mg Capsules 1 tab daily         OBJECTIVE:        Vitals:         Current: 8/8/2019 11:10:39 AM    Ht:  5 ft, 5.375 in;  Wt: 244.4 lbs;  BMI: 40.2    T: 98.3 F (oral);  BP: 115/69 mm Hg (right arm, sitting);  P: 89 bpm (right arm (BP Cuff), sitting);  sCr: 0.78 mg/dL;  GFR: 132.15        Exams:     PHYSICAL EXAM:     GENERAL: no apparent distress;     RESPIRATORY: normal respiratory rate and pattern with no distress; normal breath sounds with no rales, rhonchi, wheezes or rubs;     CARDIOVASCULAR: normal rate; rhythm is regular;     GASTROINTESTINAL: nontender; normal bowel  sounds;     MUSCULOSKELETAL: pain with range of motion in: back flexion and extension;     NEUROLOGIC: mental status: alert and oriented x 3; GROSSLY INTACT     PSYCHIATRIC:  appropriate affect and demeanor; normal speech pattern; grossly normal memory;         Lab/Test Results:             Glucose, Urine:  Neg (08/08/2019),     Bilirubin, urine:  Negative (08/08/2019),     Ketones, Urine Strip:  Negative (08/08/2019),     Specific Gravity, urine:  1.025 (08/08/2019),     Blood in Urine:  trace (08/08/2019),     pH, urine:  5.5 (08/08/2019),     Protein Urine QL:  negative (08/08/2019),     Urobilinogen, urine:  0.2 E.U./dL (08/08/2019),     Nitrite, Urine:  Negative (08/08/2019),     Leukoctyes, urine:  Negative (08/08/2019),     Appearance:  Cloudy (08/08/2019),     collection source:  Clean-catch (08/08/2019),     Color:  Yellow (08/08/2019),     Performed by::  MNP @ 1119 (08/08/2019),             ASSESSMENT           592.0   N20.9  Kidney stone              DDx:         ORDERS:         Meds Prescribed:       Promethazine HCl 25mg Tablet 1 tab q 6 - 8 hours prn nausea/vomitting  #14 (Fourteen) tablet(s) Refills: 0       Tramadol 50mg Tablet 1  tid , prn pain  #10 (Ten) tablet(s) Refills: 0         Lab Orders:       [id number]  Urinalysis, automated, without microscopy  (In-House)           Procedures Ordered:       REFER  Referral to Specialist or Other Facility  (Send-Out)                   PLAN:          Kidney stone         REFERRALS:  Referral initiated to a urologist.      RECOMMENDATIONS given include: sandeep indicates hydrocodone x 3 since 1-28-19 from 3 different providers 2 ot which are dentist and 2 rx for oxycodone from dental surgeon.  warn of addiction potential of hydrocodone and oxycodone.  increase fluids..            Prescriptions:       Promethazine HCl 25mg Tablet 1 tab q 6 - 8 hours prn nausea/vomitting  #14 (Fourteen) tablet(s) Refills: 0       Tramadol 50mg Tablet 1  tid , prn pain  #10 (Ten)  tablet(s) Refills: 0           Orders:       REFER  Referral to Specialist or Other Facility  (Send-Out)               Other Orders:       [id number]  Urinalysis, automated, without microscopy  (In-House)           CHARGE CAPTURE           **Please note: ICD descriptions below are intended for billing purposes only and may not represent clinical diagnoses**        Primary Diagnosis:         592.0 Kidney stone            N20.9    Urinary calculus, unspecified              Orders:          [id number]   Office/outpatient visit; established patient, level 3  (In-House)               Other Orders:           [id number]   Urinalysis, automated, without microscopy  (In-House)

## 2021-07-01 VITALS
HEIGHT: 65 IN | SYSTOLIC BLOOD PRESSURE: 115 MMHG | WEIGHT: 244.4 LBS | TEMPERATURE: 98.3 F | DIASTOLIC BLOOD PRESSURE: 69 MMHG | BODY MASS INDEX: 40.72 KG/M2 | HEART RATE: 89 BPM

## 2021-07-01 VITALS
DIASTOLIC BLOOD PRESSURE: 80 MMHG | TEMPERATURE: 97.7 F | HEART RATE: 90 BPM | WEIGHT: 250.6 LBS | SYSTOLIC BLOOD PRESSURE: 128 MMHG | HEIGHT: 65 IN | BODY MASS INDEX: 41.75 KG/M2

## 2021-07-02 VITALS
TEMPERATURE: 98.2 F | BODY MASS INDEX: 34.42 KG/M2 | SYSTOLIC BLOOD PRESSURE: 134 MMHG | WEIGHT: 206.6 LBS | DIASTOLIC BLOOD PRESSURE: 92 MMHG | HEART RATE: 102 BPM | HEIGHT: 65 IN

## 2021-07-02 VITALS
WEIGHT: 209.4 LBS | DIASTOLIC BLOOD PRESSURE: 69 MMHG | HEART RATE: 100 BPM | BODY MASS INDEX: 34.89 KG/M2 | TEMPERATURE: 97.4 F | SYSTOLIC BLOOD PRESSURE: 107 MMHG | HEIGHT: 65 IN

## 2021-07-02 VITALS
SYSTOLIC BLOOD PRESSURE: 123 MMHG | BODY MASS INDEX: 37.69 KG/M2 | WEIGHT: 226.2 LBS | TEMPERATURE: 96.7 F | HEART RATE: 65 BPM | DIASTOLIC BLOOD PRESSURE: 84 MMHG | HEIGHT: 65 IN

## 2021-09-03 ENCOUNTER — NURSE TRIAGE (OUTPATIENT)
Dept: CALL CENTER | Facility: HOSPITAL | Age: 31
End: 2021-09-03

## 2021-09-03 NOTE — TELEPHONE ENCOUNTER
"Caller thinks she may have had a miscarriage last week.  She went to a local ER several days later and states that they did nothing for her.  She is still bleeding and passing a lot of clots, she is still having mod to severe low abd cramping.  She has an appt with her PCP for Tuesday, not wanting to go back to the ER.  Explained that she needs an US and her PCP will not be able to do this in the office, voices understanding.  Reason for Disposition  • Patient sounds very sick or weak to the triager    Additional Information  • Negative: Sounds like a life-threatening emergency to the triager  • Negative: Abdominal cramps unrelated to menstrual period  • Negative: [1] SEVERE pain AND [2] pain clearly increases with coughing  • Negative: [1] SEVERE vaginal bleeding (e.g., soaking 2 pads or tampons per hour and present 2 or more hours) AND [2] worse than ever before  • Negative: [1] MODERATE vaginal bleeding (e.g., soaking 1 pad or tampon per hour and present > 6 hours) AND [2] worse than ever before  • Negative: Fever > 100.4 F (38.0 C)    Answer Assessment - Initial Assessment Questions  1. LOCATION: \"Where does it hurt?\"       Low abd  2. ONSET: \"When did this episode of pain begin?\"        Been going on a week  3. SEVERITY: \"How bad is the pain?\" \"Are you missing school or work because of the pain?\"  (e.g., Scale 1-10; mild, moderate, or severe)    - MILD (1-3): doesn't interfere with normal activities, lasting 1-2 days     - MODERATE (4-7): interferes with normal activities (missing work or school), lasting 2-3 days, some associated GI symptoms     - SEVERE (8-10): excruciating pain, lasting 2-7 days, associated GI symptoms, pain radiating into thighs and back      Mod to severe  4. VAGINAL BLEEDING: \"Describe the bleeding that you are having.\" \"How much bleeding is there?\"     - SPOTTING: spotting, or pinkish / brownish mucous discharge; does not fill panti-liner or pad     - MILD:  less than 1 pad / hour; less " "than patient's usual menstrual bleeding    - MODERATE: 1-2 pads / hour; 1 menstrual cup every 6 hours; small-medium blood clots (e.g., pea, grape, small coin)    - SEVERE: soaking 2 or more pads/hour for 2 or more hours; 1 menstrual cup every 2 hours; bleeding not contained by pads or continuous red blood from vagina; large blood clots (e.g., golf ball, large coin)       mod  5. MENSTRUAL HISTORY:  \"When did this menstrual period begin?\", \"Is this a normal period for you?\"        Last week  6. LMP:  \"When did your last menstrual period begin?\"      Was early  7. OTHER SYMPTOMS: \"What other symptoms are you having with the pain?\" (e.g., fever, dizzy/lighthead, vomiting, diarrhea, vaginal discharge)      Passing lots of large clots, having pain  8. PREGNANCY: \"Is there any chance you are pregnant?\" (e.g., unprotected intercourse, missed birth control pill, broken condom)      Possible miscarage    Protocols used: ABDOMINAL PAIN - MENSTRUAL CRAMPS-ADULT-AH    "

## 2022-09-01 ENCOUNTER — OFFICE VISIT (OUTPATIENT)
Dept: FAMILY MEDICINE CLINIC | Age: 32
End: 2022-09-01

## 2022-09-01 VITALS
DIASTOLIC BLOOD PRESSURE: 83 MMHG | TEMPERATURE: 97.8 F | WEIGHT: 236.6 LBS | OXYGEN SATURATION: 98 % | HEIGHT: 65 IN | HEART RATE: 104 BPM | BODY MASS INDEX: 39.42 KG/M2 | SYSTOLIC BLOOD PRESSURE: 114 MMHG

## 2022-09-01 DIAGNOSIS — Z72.0 TOBACCO ABUSE: ICD-10-CM

## 2022-09-01 DIAGNOSIS — R53.83 OTHER FATIGUE: Primary | ICD-10-CM

## 2022-09-01 DIAGNOSIS — F43.23 ADJUSTMENT DISORDER WITH MIXED ANXIETY AND DEPRESSED MOOD: ICD-10-CM

## 2022-09-01 DIAGNOSIS — R29.818 SUSPECTED SLEEP APNEA: ICD-10-CM

## 2022-09-01 DIAGNOSIS — L40.9 PSORIASIS: ICD-10-CM

## 2022-09-01 LAB
EXPIRATION DATE: NORMAL
EXPIRATION DATE: NORMAL
FLUAV AG UPPER RESP QL IA.RAPID: NOT DETECTED
FLUBV AG UPPER RESP QL IA.RAPID: NOT DETECTED
INTERNAL CONTROL: NORMAL
INTERNAL CONTROL: NORMAL
Lab: NORMAL
Lab: NORMAL
S PYO AG THROAT QL: NEGATIVE
SARS-COV-2 AG UPPER RESP QL IA.RAPID: NOT DETECTED

## 2022-09-01 PROCEDURE — 87880 STREP A ASSAY W/OPTIC: CPT | Performed by: NURSE PRACTITIONER

## 2022-09-01 PROCEDURE — 87428 SARSCOV & INF VIR A&B AG IA: CPT | Performed by: NURSE PRACTITIONER

## 2022-09-01 PROCEDURE — 99214 OFFICE O/P EST MOD 30 MIN: CPT | Performed by: NURSE PRACTITIONER

## 2022-09-01 PROCEDURE — 87081 CULTURE SCREEN ONLY: CPT | Performed by: NURSE PRACTITIONER

## 2022-09-01 RX ORDER — ALBUTEROL SULFATE 90 UG/1
2 AEROSOL, METERED RESPIRATORY (INHALATION) EVERY 4 HOURS PRN
Qty: 18 G | Refills: 2 | Status: SHIPPED | OUTPATIENT
Start: 2022-09-01 | End: 2023-03-10

## 2022-09-01 NOTE — PROGRESS NOTES
"Chief Complaint  Nausea (Weakness, fatigue, hoarse voice, sore throat x 1 day), Depression (medication), and Psoriasis (Would like referral to dermatology)    Subjective          Marissa Brantley presents to [facility] for depression and anxiety medication.    The patient reports she was going to outpatient rehab, but she stopped going there when she went to [facility] back on 07/04/2022. She states she needs to get her medication back. The patient reports she was given samples of Trintellix 10 mg through [facility], which worked well for her. She states she has been having panic attacks. The patient reports she has been on Wellbutrin and Paxil in the past, but she did not like it. Denies thoughts of harming herself or others.     The patient reports her psoriasis has returned twice as bad with all of her anxiety. She states she has not been on the medication for a while. The patient reports her anxiety makes her dig at it worse.    The patient reports she woke up this morning with body aches, nausea, hoarse voice, cough, and chest pain. She states she thought she was having an asthma attack. The patient reports she has been smoking since she was 15 years old. She states when she has these attacks, it lasts for a couple of hours and feels like an elephant is sitting on her chest.    The patient reports if she sleeps on her back, she wakes up gagging and cannot breathe. She states she thinks she needs a sleep apnea machine. The patient does not want a referral for a sleep apnea study.    The patient denies fever, chills, or chest pain. Was exposed to COVID earlier this week.          Objective   Vital Signs:   /83 (BP Location: Left arm, Patient Position: Sitting, Cuff Size: Large Adult)   Pulse 104   Temp 97.8 °F (36.6 °C) (Oral)   Ht 166.1 cm (65.39\")   Wt 107 kg (236 lb 9.6 oz)   SpO2 98%   BMI 38.90 kg/m²       Physical Exam  Vitals reviewed. "   Constitutional:       General: She is not in acute distress.     Appearance: She is well-developed. She is not ill-appearing.   HENT:      Head: Normocephalic and atraumatic.   Eyes:      Conjunctiva/sclera: Conjunctivae normal.      Pupils: Pupils are equal, round, and reactive to light.   Cardiovascular:      Rate and Rhythm: Normal rate and regular rhythm.      Heart sounds: Normal heart sounds. No murmur heard.  Pulmonary:      Effort: Pulmonary effort is normal. No respiratory distress.      Breath sounds: Normal breath sounds.   Abdominal:      General: Bowel sounds are normal. There is no distension.      Palpations: Abdomen is soft. There is no mass.      Tenderness: There is no abdominal tenderness.      Hernia: No hernia is present.   Skin:     General: Skin is warm and dry.      Findings: Rash present.      Comments: Dry scaly rash to RUQ and extremities.    Neurological:      Mental Status: She is alert and oriented to person, place, and time.   Psychiatric:         Mood and Affect: Mood and affect normal.         Behavior: Behavior normal.         Thought Content: Thought content normal.         Judgment: Judgment normal.              Result Review :                Assessment and Plan    Diagnoses and all orders for this visit:    1. Other fatigue (Primary)  Comments:  All swabs negative. Inhaler has been sent in to use as needed. She is to retest for COVID at home tomorrow or the following day since she had been exposed.   Orders:  -     POCT SARS-CoV-2 Antigen ALEX + Flu  -     POCT rapid strep A  -     Beta Strep Culture, Throat - , Throat; Future  -     Beta Strep Culture, Throat - Swab, Throat    2. Adjustment disorder with mixed anxiety and depressed mood  Assessment & Plan:  Trintellix 10 mg sent in.  Will have patient return to office in 6 weeks for follow-up.  Patient tolerating medication well in the past.      Orders:  -     Vortioxetine HBr (Trintellix) 10 MG tablet tablet; Take 10 mg by  mouth Daily With Breakfast.  Dispense: 30 tablet; Refill: 1    3. Psoriasis  Assessment & Plan:  Referral initiated.     Orders:  -     Ambulatory Referral to Dermatology    4. Tobacco abuse  Assessment & Plan:  Patient strongly encouraged to consider tobacco cessation.  Potential health complications discussed.  Patient is aware.  Patient is not interested in cessation at this time.  Encouraged patient to reach out to the office when they are ready to quit.      5. Suspected sleep apnea  Comments:  Declined referral to sleep medicine.    Other orders  -     albuterol sulfate  (90 Base) MCG/ACT inhaler; Inhale 2 puffs Every 4 (Four) Hours As Needed for Wheezing or Shortness of Air.  Dispense: 18 g; Refill: 2    Patient to notify office with any acute concerns or issues.  Patient verbalizes understanding, agrees with plan of care and has no further questions upon discharge.     Please note that portions of this note were completed with a voice recognition program.      Follow Up    No follow-ups on file.  Patient was given instructions and counseling regarding her condition or for health maintenance advice. Please see specific information pulled into the AVS if appropriate.       This note has been created using Dragon Ambient eXperience and was completed in the EHR by KARO Modi     Transcribed from ambient dictation for KARO Garcia by Brandy Caceres.  09/01/22   17:07 EDT    Patient verbalized consent to the visit recording.

## 2022-09-03 LAB — BACTERIA SPEC AEROBE CULT: NORMAL

## 2022-09-15 PROBLEM — L40.9 PSORIASIS: Status: ACTIVE | Noted: 2022-09-15

## 2022-09-15 PROBLEM — Z72.0 TOBACCO ABUSE: Status: ACTIVE | Noted: 2022-09-15

## 2022-09-15 PROBLEM — F43.23 ADJUSTMENT DISORDER WITH MIXED ANXIETY AND DEPRESSED MOOD: Status: ACTIVE | Noted: 2022-09-15

## 2022-09-15 NOTE — ASSESSMENT & PLAN NOTE
Trintellix 10 mg sent in.  Will have patient return to office in 6 weeks for follow-up.  Patient tolerating medication well in the past.

## 2022-09-15 NOTE — ASSESSMENT & PLAN NOTE
Patient strongly encouraged to consider tobacco cessation.  Potential health complications discussed.  Patient is aware.  Patient is not interested in cessation at this time.  Encouraged patient to reach out to the office when they are ready to quit.

## 2022-09-27 ENCOUNTER — OFFICE VISIT (OUTPATIENT)
Dept: FAMILY MEDICINE CLINIC | Age: 32
End: 2022-09-27

## 2022-09-27 VITALS
DIASTOLIC BLOOD PRESSURE: 88 MMHG | WEIGHT: 239.8 LBS | TEMPERATURE: 98.9 F | SYSTOLIC BLOOD PRESSURE: 131 MMHG | OXYGEN SATURATION: 99 % | HEART RATE: 89 BPM | BODY MASS INDEX: 39.95 KG/M2 | HEIGHT: 65 IN

## 2022-09-27 DIAGNOSIS — T63.481A INSECT STINGS, ACCIDENTAL OR UNINTENTIONAL, INITIAL ENCOUNTER: Primary | ICD-10-CM

## 2022-09-27 PROCEDURE — 99213 OFFICE O/P EST LOW 20 MIN: CPT | Performed by: PHYSICIAN ASSISTANT

## 2022-09-27 RX ORDER — HYDROXYZINE HYDROCHLORIDE 25 MG/1
25 TABLET, FILM COATED ORAL 3 TIMES DAILY PRN
Qty: 9 TABLET | Refills: 0 | Status: SHIPPED | OUTPATIENT
Start: 2022-09-27 | End: 2022-09-30

## 2022-09-27 NOTE — PROGRESS NOTES
Subjective     CHIEF COMPLAINT    Chief Complaint   Patient presents with   • Insect Bite     Pt was stung yesterday by a bee she believes. (R) leg. Pt states its getting redder and redder, itches and burns.             History of Present Illness  This is a 32-year-old female presenting to clinic after an insect sting yesterday.  She was stung by some sort of bee but states it was not a wasp or a yellowjacket.  She is not exactly sure what it was.  She complains today of persistent redness, itching, and burning.  Denies any shortness of breath, wheezing, nausea or vomiting.  She is otherwise feeling well.    She does report a history of a wasp sting on her left arm.  She states for this she went to the emergency department 2 to 3 days afterwards and needed steroids to manage her reaction.            Review of Systems   Constitutional: Negative for chills and fever.   Respiratory: Negative for shortness of breath and wheezing.    Gastrointestinal: Negative for nausea and vomiting.   Skin: Positive for wound (bee sting).            Past Medical History:   Diagnosis Date   • ADD (attention deficit disorder)    • Anxiety    • Chicken pox    • Fatigue    • GERD without esophagitis    • Hemorrhoids    • Irregular menstruation     frequency and duration   • Osteoarthritis     primarily affecting the right wrist post orif   • Rash and nonspecific skin eruption    • Renal stones             Past Surgical History:   Procedure Laterality Date   •  SECTION      X2   • CHOLECYSTECTOMY  2017    laparoscpopic   • WRIST FRACTURE SURGERY Right 2009            Family History   Problem Relation Age of Onset   • Diabetes type II Mother    • Benign prostatic hyperplasia Father    • No Known Problems Sister    • No Known Problems Brother    • Heart defect Daughter             Social History     Socioeconomic History   • Marital status: Legally    • Number of children: 2   Tobacco Use   • Smoking status: Current  "Every Day Smoker     Packs/day: 0.50     Years: 17.00     Pack years: 8.50     Start date: 2007   • Smokeless tobacco: Never Used   Substance and Sexual Activity   • Alcohol use: Not Currently   • Drug use: Yes     Types: Marijuana     Comment: \"like once a week\"   • Sexual activity: Yes            No Known Allergies         Current Outpatient Medications on File Prior to Visit   Medication Sig Dispense Refill   • albuterol sulfate  (90 Base) MCG/ACT inhaler Inhale 2 puffs Every 4 (Four) Hours As Needed for Wheezing or Shortness of Air. 18 g 2   • Vortioxetine HBr (Trintellix) 10 MG tablet tablet Take 10 mg by mouth Daily With Breakfast. 30 tablet 1     No current facility-administered medications on file prior to visit.            /88 (BP Location: Left arm, Patient Position: Sitting, Cuff Size: Adult)   Pulse 89   Temp 98.9 °F (37.2 °C) (Oral)   Ht 166.1 cm (65.39\")   Wt 109 kg (239 lb 12.8 oz)   SpO2 99% Comment: room air  BMI 39.43 kg/m²          Objective     Physical Exam  Vitals and nursing note reviewed.   Constitutional:       General: She is not in acute distress.     Appearance: Normal appearance.   Skin:     General: Skin is warm and dry.      Findings: Erythema (4 cm area round, warm erythema to right medial/posterior thigh. nontender, no fluctuance. mild induration. ) present.   Neurological:      Mental Status: She is alert and oriented to person, place, and time.   Psychiatric:         Mood and Affect: Mood normal.         Behavior: Behavior normal.              Procedures                    Lab Results (last 24 hours)     ** No results found for the last 24 hours. **                No Radiology Exams Resulted Within Past 24 Hours                    Diagnoses and all orders for this visit:    1. Insect stings, accidental or unintentional, initial encounter (Primary)  Comments:  Patient contact the clinic if symptoms are worsening.  She should go to the ER for any concerns regarding " anaphylaxis.  Symptoms reviewed.  Orders:  -     triamcinolone (KENALOG) 0.1 % ointment; Apply 1 application topically to the appropriate area as directed 2 (Two) Times a Day.  Dispense: 15 g; Refill: 0  -     hydrOXYzine (ATARAX) 25 MG tablet; Take 1 tablet by mouth 3 (Three) Times a Day As Needed for Itching for up to 3 days.  Dispense: 9 tablet; Refill: 0                           FOR FULL DISCHARGE INSTRUCTIONS/COMMENTS/HANDOUTS please see the AVS

## 2022-09-29 ENCOUNTER — OFFICE VISIT (OUTPATIENT)
Dept: FAMILY MEDICINE CLINIC | Age: 32
End: 2022-09-29

## 2022-09-29 VITALS
BODY MASS INDEX: 39.95 KG/M2 | TEMPERATURE: 98.1 F | WEIGHT: 239.8 LBS | DIASTOLIC BLOOD PRESSURE: 79 MMHG | SYSTOLIC BLOOD PRESSURE: 116 MMHG | HEART RATE: 105 BPM | HEIGHT: 65 IN

## 2022-09-29 DIAGNOSIS — B00.1 COLD SORE: ICD-10-CM

## 2022-09-29 DIAGNOSIS — T63.481A INSECT STINGS, ACCIDENTAL OR UNINTENTIONAL, INITIAL ENCOUNTER: Primary | ICD-10-CM

## 2022-09-29 DIAGNOSIS — B34.9 NONSPECIFIC SYNDROME SUGGESTIVE OF VIRAL ILLNESS: ICD-10-CM

## 2022-09-29 PROCEDURE — 99213 OFFICE O/P EST LOW 20 MIN: CPT | Performed by: NURSE PRACTITIONER

## 2022-09-29 RX ORDER — VALACYCLOVIR HYDROCHLORIDE 1 G/1
2000 TABLET, FILM COATED ORAL 2 TIMES DAILY
Qty: 4 TABLET | Refills: 0 | Status: SHIPPED | OUTPATIENT
Start: 2022-09-29 | End: 2022-09-30

## 2022-09-29 RX ORDER — METHYLPREDNISOLONE 4 MG/1
1 TABLET ORAL DAILY
Qty: 21 TABLET | Refills: 0 | Status: SHIPPED | OUTPATIENT
Start: 2022-09-29 | End: 2022-12-12

## 2022-09-29 NOTE — PROGRESS NOTES
"Marissa Brantley presents to [facility] with complaint of  Insect Bite (Follow up)    SUBJECTIVE  History of Present Illness     Patient is here for insect bite. She was seen here in the clinic 2 days ago by Kori Funez and was given kenalog ointment to apply to bite and atarax for itching. She was sting by an unknown insect 3 days ago. She is here today as she noticed her insect sting turning purple and is having lip lesions and swelling. She says the kenalog oint has not made a difference. She says she put medication similar to Abreva on her lips and it has not really helped. She does have a hx of cold sores she says. She denies drainage from lip lesions or fever. She does say she does not feel well overall, having some fatigue and chills. She denies HA, NVD, CP, SOA, cough, known fever, dizziness, syncope, or throat closing sensation.      OBJECTIVE  Vital Signs:   /79 (BP Location: Left arm, Patient Position: Sitting)   Pulse 105   Temp 98.1 °F (36.7 °C) (Oral)   Ht 165.1 cm (65\")   Wt 109 kg (239 lb 12.8 oz)   BMI 39.90 kg/m²       Physical Exam  Vitals reviewed.   Constitutional:       General: She is not in acute distress.     Appearance: Normal appearance. She is not ill-appearing.   HENT:      Head: Normocephalic and atraumatic.      Nose: Nose normal.      Mouth/Throat:      Lips: Lesions (2 lesions with yellow center, bottom lip erythematous and slightly edematous ) present.      Mouth: Mucous membranes are moist.      Pharynx: Oropharynx is clear.   Cardiovascular:      Rate and Rhythm: Normal rate and regular rhythm.      Pulses: Normal pulses.      Heart sounds: Normal heart sounds.   Pulmonary:      Effort: Pulmonary effort is normal.      Breath sounds: Normal breath sounds.   Musculoskeletal:      Cervical back: Neck supple.   Skin:     General: Skin is warm and dry.      Comments: 4 cm oval purple discoloration noted to right inner thigh, no " warmth, nontender, no fluctuance, mild induration    Neurological:      General: No focal deficit present.      Mental Status: She is alert and oriented to person, place, and time. Mental status is at baseline.   Psychiatric:         Mood and Affect: Mood normal.         Behavior: Behavior normal.         Judgment: Judgment normal.            ASSESSMENT AND PLAN:  Diagnoses and all orders for this visit:    1. Insect stings, accidental or unintentional, initial encounter (Primary)  -     methylPREDNISolone (MEDROL) 4 MG dose pack; Take 1 tablet by mouth Daily. Take as directed on package instructions.  Dispense: 21 tablet; Refill: 0    2. Cold sore  -     valACYclovir (Valtrex) 1000 MG tablet; Take 2 tablets by mouth 2 (Two) Times a Day for 2 doses.  Dispense: 4 tablet; Refill: 0    3. Nonspecific syndrome suggestive of viral illness      -lip lesions do no look consistent with allergic reaction, looks concerning for cold sores-treat with valtrex, may cont abreva if she likes   -she was given steroid pack for insect bite-not concerned for cellulitis/infectious process today  -her fatigue and overall not feeling well may be her coming down with a viral illness, which may explain cold sore flare  - encouraged to increase rest and fluids. May take Tylenol for pain or fever. If symptoms persist 7-10 days or longer, come back in to be seen.   -discussed ER precautions regarding allergic reaction which is not suspected today regarding insect bite       Follow Up   Return if symptoms worsen or fail to improve. Patient to notify office with any acute concerns or issues.  Patient verbalizes understanding, agrees with plan of care and has no further questions upon discharge.     Patient was given instructions and counseling regarding her condition or for health maintenance advice. Please see specific information pulled into the AVS if appropriate.     Discussed the importance of following up with any needed screening  tests/labs/specialist appointments and any requested follow-up recommended by me today. Importance of maintaining follow-up discussed and patient accepts that missed appointments can delay diagnosis and potentially lead to worsening of conditions.

## 2022-12-12 ENCOUNTER — OFFICE VISIT (OUTPATIENT)
Dept: FAMILY MEDICINE CLINIC | Age: 32
End: 2022-12-12

## 2022-12-12 VITALS
SYSTOLIC BLOOD PRESSURE: 124 MMHG | HEART RATE: 108 BPM | BODY MASS INDEX: 40.79 KG/M2 | HEIGHT: 65 IN | DIASTOLIC BLOOD PRESSURE: 70 MMHG | TEMPERATURE: 98.4 F | WEIGHT: 244.8 LBS

## 2022-12-12 DIAGNOSIS — L40.9 PSORIASIS: ICD-10-CM

## 2022-12-12 DIAGNOSIS — M54.41 ACUTE MIDLINE LOW BACK PAIN WITH RIGHT-SIDED SCIATICA: Primary | ICD-10-CM

## 2022-12-12 DIAGNOSIS — K21.9 GASTROESOPHAGEAL REFLUX DISEASE, UNSPECIFIED WHETHER ESOPHAGITIS PRESENT: ICD-10-CM

## 2022-12-12 PROCEDURE — 99214 OFFICE O/P EST MOD 30 MIN: CPT | Performed by: NURSE PRACTITIONER

## 2022-12-12 RX ORDER — TIZANIDINE 4 MG/1
4 TABLET ORAL EVERY 6 HOURS PRN
Qty: 30 TABLET | Refills: 0 | Status: SHIPPED | OUTPATIENT
Start: 2022-12-12 | End: 2023-01-16

## 2022-12-12 RX ORDER — NAPROXEN 500 MG/1
500 TABLET ORAL 2 TIMES DAILY
COMMUNITY
Start: 2022-12-08 | End: 2023-02-16

## 2022-12-12 RX ORDER — BACLOFEN 10 MG/1
10 TABLET ORAL 3 TIMES DAILY
COMMUNITY
Start: 2022-12-07 | End: 2022-12-12

## 2022-12-12 RX ORDER — PREDNISONE 20 MG/1
20 TABLET ORAL DAILY
Qty: 5 TABLET | Refills: 0 | Status: SHIPPED | OUTPATIENT
Start: 2022-12-12 | End: 2023-01-16

## 2022-12-12 RX ORDER — OMEPRAZOLE 40 MG/1
40 CAPSULE, DELAYED RELEASE ORAL DAILY
Qty: 90 CAPSULE | Refills: 0 | Status: SHIPPED | OUTPATIENT
Start: 2022-12-12 | End: 2023-02-16

## 2022-12-12 RX ORDER — OMEPRAZOLE 40 MG/1
40 CAPSULE, DELAYED RELEASE ORAL DAILY
Qty: 30 CAPSULE | Refills: 1 | Status: SHIPPED | OUTPATIENT
Start: 2022-12-12 | End: 2022-12-12

## 2023-01-16 ENCOUNTER — LAB (OUTPATIENT)
Dept: LAB | Facility: HOSPITAL | Age: 33
End: 2023-01-16
Payer: COMMERCIAL

## 2023-01-16 ENCOUNTER — OFFICE VISIT (OUTPATIENT)
Dept: FAMILY MEDICINE CLINIC | Age: 33
End: 2023-01-16
Payer: COMMERCIAL

## 2023-01-16 VITALS
BODY MASS INDEX: 40.82 KG/M2 | HEIGHT: 65 IN | SYSTOLIC BLOOD PRESSURE: 114 MMHG | HEART RATE: 92 BPM | TEMPERATURE: 97.9 F | WEIGHT: 245 LBS | DIASTOLIC BLOOD PRESSURE: 80 MMHG

## 2023-01-16 DIAGNOSIS — Z32.01 POSITIVE URINE PREGNANCY TEST: Primary | ICD-10-CM

## 2023-01-16 DIAGNOSIS — Z32.01 POSITIVE URINE PREGNANCY TEST: ICD-10-CM

## 2023-01-16 DIAGNOSIS — R11.2 NAUSEA AND VOMITING, UNSPECIFIED VOMITING TYPE: ICD-10-CM

## 2023-01-16 LAB
B-HCG UR QL: POSITIVE
EXPIRATION DATE: ABNORMAL
INTERNAL NEGATIVE CONTROL: ABNORMAL
INTERNAL POSITIVE CONTROL: ABNORMAL
Lab: ABNORMAL

## 2023-01-16 PROCEDURE — 36415 COLL VENOUS BLD VENIPUNCTURE: CPT

## 2023-01-16 PROCEDURE — 81025 URINE PREGNANCY TEST: CPT | Performed by: NURSE PRACTITIONER

## 2023-01-16 PROCEDURE — 99213 OFFICE O/P EST LOW 20 MIN: CPT | Performed by: NURSE PRACTITIONER

## 2023-01-16 PROCEDURE — 84702 CHORIONIC GONADOTROPIN TEST: CPT

## 2023-01-16 NOTE — PROGRESS NOTES
"Marissa Brantley presents to [facility] with complaint of  Vomiting (X 2 weeks, pt states she has irregular cycles, states she hasnt had one for about three months. Pt would like to have a pregnancy test today. She also states that she has had swelling to her hands and feet )    SUBJECTIVE  Vomiting   This is a new problem. Episode onset: 2 wks ago. The problem occurs less than 2 times per day. The problem has been unchanged. There has been no fever. Associated symptoms include dizziness. Pertinent negatives include no abdominal pain, arthralgias, chest pain, chills, coughing, diarrhea, fever, headaches, myalgias, sweats, URI or weight loss. Associated symptoms comments: amenorrhea for 3 months, edema in hands and feet . She has tried nothing for the symptoms.      July 2023   OBJECTIVE  Vital Signs:   /80 (BP Location: Right arm, Patient Position: Sitting)   Pulse 92   Temp 97.9 °F (36.6 °C) (Oral)   Ht 165.1 cm (65\")   Wt 111 kg (245 lb)   BMI 40.77 kg/m²       Physical Exam  Vitals reviewed.   Constitutional:       General: She is not in acute distress.     Appearance: Normal appearance. She is not ill-appearing.   HENT:      Head: Normocephalic and atraumatic.      Nose: Nose normal.      Mouth/Throat:      Mouth: Mucous membranes are moist.      Pharynx: Oropharynx is clear.   Cardiovascular:      Rate and Rhythm: Normal rate and regular rhythm.      Pulses: Normal pulses.      Heart sounds: Normal heart sounds.   Pulmonary:      Effort: Pulmonary effort is normal.      Breath sounds: Normal breath sounds.   Musculoskeletal:      Cervical back: Neck supple.   Skin:     General: Skin is warm and dry.   Neurological:      General: No focal deficit present.      Mental Status: She is alert and oriented to person, place, and time. Mental status is at baseline.   Psychiatric:         Mood and Affect: Mood normal.         Behavior: Behavior normal.         Judgment: " Judgment normal.          Results Review:  The following data was reviewed by KARO Bingham [unfilled] 09:57 EST.    Office Visit on 01/16/2023   Component Date Value Ref Range Status   • HCG, Urine, QL 01/16/2023 Positive (A)  Negative Final   • Lot Number 01/16/2023 [id number]   Final   • Internal Positive Control 01/16/2023 Passed  Positive, Passed Final   • Internal Negative Control 01/16/2023 Passed  Negative, Passed Final   • Expiration Date 01/16/2023 3/31/2024   Final         ASSESSMENT AND PLAN:  Diagnoses and all orders for this visit:    1. Positive urine pregnancy test (Primary)  -     hCG, Quantitative, Pregnancy; Future    2. Nausea and vomiting, unspecified vomiting type  -     POCT pregnancy, urine      -pt will confirm positive urine pregnancy test with blood work today  -She is not sure when exactly her last period was but believes it was sometime in October, her due date may be sometime in July  -Recommend patient's stop smoking, start taking prenatal vitamin, schedule OB/GYN appointment    Follow Up   No follow-ups on file. Patient to notify office with any acute concerns or issues.  Patient verbalizes understanding, agrees with plan of care and has no further questions upon discharge.     Patient was given instructions and counseling regarding her condition or for health maintenance advice. Please see specific information pulled into the AVS if appropriate.       Part of this note may be an electronic transcription/translation of spoken language to printed text using the Dragon Dictation System.

## 2023-01-17 ENCOUNTER — TELEPHONE (OUTPATIENT)
Dept: FAMILY MEDICINE CLINIC | Age: 33
End: 2023-01-17
Payer: COMMERCIAL

## 2023-01-17 LAB — HCG INTACT+B SERPL-ACNC: NORMAL MIU/ML

## 2023-01-17 NOTE — TELEPHONE ENCOUNTER
Caller: Marissa Brantley    Relationship: Self    Best call back number: [phone]    Caller requesting test results: YES     What test was performed: PREGNANCY TEST     When was the test performed: 01/17    Where was the test performed: IN OFFICE     Additional notes: PATIENT STATED SHE  RECEIVED HER RESULTS ON MY CHART  PATIENT WOULD LIKE TO KNOW HOW FAR ALONG SHE IS IN HER PREGNANCY. PLEASE ADVISE

## 2023-02-16 ENCOUNTER — OFFICE VISIT (OUTPATIENT)
Dept: FAMILY MEDICINE CLINIC | Age: 33
End: 2023-02-16
Payer: COMMERCIAL

## 2023-02-16 VITALS
TEMPERATURE: 97.7 F | SYSTOLIC BLOOD PRESSURE: 108 MMHG | WEIGHT: 248 LBS | HEART RATE: 105 BPM | DIASTOLIC BLOOD PRESSURE: 74 MMHG | HEIGHT: 65 IN | BODY MASS INDEX: 41.32 KG/M2

## 2023-02-16 DIAGNOSIS — J06.9 VIRAL URI WITH COUGH: Primary | ICD-10-CM

## 2023-02-16 PROCEDURE — 87081 CULTURE SCREEN ONLY: CPT | Performed by: NURSE PRACTITIONER

## 2023-02-16 PROCEDURE — 99213 OFFICE O/P EST LOW 20 MIN: CPT | Performed by: NURSE PRACTITIONER

## 2023-02-16 PROCEDURE — 87880 STREP A ASSAY W/OPTIC: CPT | Performed by: NURSE PRACTITIONER

## 2023-02-16 PROCEDURE — 87428 SARSCOV & INF VIR A&B AG IA: CPT | Performed by: NURSE PRACTITIONER

## 2023-02-16 RX ORDER — DIPHENHYDRAMINE HCL 25 MG
50 CAPSULE ORAL EVERY 4 HOURS PRN
COMMUNITY
End: 2023-03-10

## 2023-02-16 NOTE — PROGRESS NOTES
"Marissa Brantley presents to [facility] with complaint of  Sore Throat (Earache, chest congestion, sinus drainage x 3 days)    SUBJECTIVE  URI   This is a new problem. Episode onset: 3 days ago. The problem has been unchanged. There has been no fever. Associated symptoms include chest pain (feels sore from coughing), congestion, coughing, ear pain, rhinorrhea, a sore throat and wheezing. Pertinent negatives include no abdominal pain, headaches, joint pain, joint swelling, nausea, plugged ear sensation, sinus pain or sneezing. Treatments tried: benadryl, pt says her OB advised her to take this  The treatment provided mild relief.       OBJECTIVE  Vital Signs:   /74 (BP Location: Right arm, Patient Position: Sitting, Cuff Size: Large Adult)   Pulse 105   Temp 97.7 °F (36.5 °C) (Oral)   Ht 165.1 cm (65\")   Wt 112 kg (248 lb)   BMI 41.27 kg/m²       Physical Exam  Vitals reviewed.   Constitutional:       General: She is not in acute distress.     Appearance: Normal appearance. She is not ill-appearing.   HENT:      Head: Normocephalic and atraumatic.      Right Ear: Tympanic membrane and ear canal normal.      Left Ear: Ear canal normal. A middle ear effusion (clear fluid) is present.      Nose: Nose normal.      Mouth/Throat:      Mouth: Mucous membranes are moist.      Pharynx: Posterior oropharyngeal erythema present. No pharyngeal swelling.      Tonsils: No tonsillar exudate.   Cardiovascular:      Rate and Rhythm: Normal rate and regular rhythm.      Pulses: Normal pulses.      Heart sounds: Normal heart sounds.   Pulmonary:      Effort: Pulmonary effort is normal.      Breath sounds: Normal breath sounds.   Musculoskeletal:      Cervical back: Neck supple.   Skin:     General: Skin is warm and dry.   Neurological:      General: No focal deficit present.      Mental Status: She is alert and oriented to person, place, and time. Mental status is at baseline. "   Psychiatric:         Mood and Affect: Mood normal.         Behavior: Behavior normal.         Judgment: Judgment normal.          Results Review:  The following data was reviewed by KARO Bingham [unfilled] 13:29 EST.    Office Visit on 02/16/2023   Component Date Value Ref Range Status   • SARS Antigen 02/16/2023 Not Detected  Not Detected, Presumptive Negative Final   • Influenza A Antigen ALEX 02/16/2023 Not Detected  Not Detected Final   • Influenza B Antigen ALEX 02/16/2023 Not Detected  Not Detected Final   • Internal Control 02/16/2023 Passed  Passed Final   • Lot Number 02/16/2023 [id number]   Final   • Expiration Date 02/16/2023 11/19/23   Final   • Rapid Strep A Screen 02/16/2023 Negative  Negative, VALID, INVALID, Not Performed Final   • Internal Control 02/16/2023 Passed  Passed Final   • Lot Number 02/16/2023 [id number]   Final   • Expiration Date 02/16/2023 5/31/24   Final         ASSESSMENT AND PLAN:  Diagnoses and all orders for this visit:    1. Viral URI with cough (Primary)  -     POCT SARS-CoV-2 Antigen ALEX + Flu  -     POCT rapid strep A  -     Beta Strep Culture, Throat - , Throat; Future  -     Beta Strep Culture, Throat - Swab, Throat      Patient was negative for flu, COVID, and strep in office today.  Recommend she continue Benadryl, may also use plain Robitussin as needed for her cough.  May also use saline spray for her nasal congestion.  She was instructed to increase rest and fluids.  May take Tylenol seen for fever.  If symptoms persist for 10 days of total illness, antibiotic may be warranted at that time.  Patient to follow-up in office if not improving.       Follow Up   Return if symptoms worsen or fail to improve. Patient to notify office with any acute concerns or issues.  Patient verbalizes understanding, agrees with plan of care and has no further questions upon discharge.     Patient was given instructions and counseling regarding her condition or for health maintenance advice.  Please see specific information pulled into the AVS if appropriate.     Discussed the importance of following up with any needed screening tests/labs/specialist appointments and any requested follow-up recommended by me today. Importance of maintaining follow-up discussed and patient accepts that missed appointments can delay diagnosis and potentially lead to worsening of conditions.    Part of this note may be an electronic transcription/translation of spoken language to printed text using the Dragon Dictation System.

## 2023-02-18 LAB — BACTERIA SPEC AEROBE CULT: NORMAL

## 2023-03-10 ENCOUNTER — TELEMEDICINE (OUTPATIENT)
Dept: FAMILY MEDICINE CLINIC | Age: 33
End: 2023-03-10
Payer: COMMERCIAL

## 2023-03-10 VITALS — WEIGHT: 248 LBS | HEIGHT: 65 IN | BODY MASS INDEX: 41.32 KG/M2

## 2023-03-10 DIAGNOSIS — M54.41 ACUTE MIDLINE LOW BACK PAIN WITH RIGHT-SIDED SCIATICA: Primary | ICD-10-CM

## 2023-03-10 PROCEDURE — 1159F MED LIST DOCD IN RCRD: CPT | Performed by: NURSE PRACTITIONER

## 2023-03-10 PROCEDURE — 1160F RVW MEDS BY RX/DR IN RCRD: CPT | Performed by: NURSE PRACTITIONER

## 2023-03-10 PROCEDURE — 99213 OFFICE O/P EST LOW 20 MIN: CPT | Performed by: NURSE PRACTITIONER

## 2023-03-10 NOTE — PROGRESS NOTES
"Chief Complaint  Back Pain (Video visit [phone].  Pregnant and having back pain, needing a note for work.)    Subjective          Marissa Haley Brantley presents to [facility] via telehealth video complaining of back pain.  Patient has history of back pain that she has used muscle relaxers before.  Patient is in her second trimester and is only able to take Tylenol.  She states she was unable to get out of bed this morning.  She is hoping to go back to work tomorrow.  She is needing a note for work.  Patient denies any acute concerns or issues at this time.  Gynecology office is closed.      Objective   Vital Signs:   Vitals:    03/10/23 1141   Weight: 112 kg (248 lb)   Height: 165.1 cm (65\")       Physical Exam  Vitals reviewed.   Constitutional:       General: She is not in acute distress.     Appearance: Normal appearance. She is well-developed.   Pulmonary:      Effort: Pulmonary effort is normal. No respiratory distress.   Neurological:      Mental Status: She is alert and oriented to person, place, and time.   Psychiatric:         Mood and Affect: Mood and affect normal.         Behavior: Behavior normal.         Thought Content: Thought content normal.         Judgment: Judgment normal.          Result Review :              Assessment and Plan    Diagnoses and all orders for this visit:    1. Acute midline low back pain with right-sided sciatica (Primary)  Comments:  Continue taking tylenol and using heating pad as needed. F/u with gynecology. Okay to give work note for today.     Patient to notify office with any acute concerns or issues.  Patient verbalizes understanding, agrees with plan of care and has no further questions upon discharge.    Please note that portions of this note were completed with a voice recognition program.    This was an audio and video enabled telemedicine encounter. You have chosen to receive care through a telephone visit. Do you consent to " use a telephone visit for your medical care today? Yes  Patient is at residence. Provider at office. Doxim[employer] used. Reina Billingsley MA also present.    Follow Up    Return if symptoms worsen or fail to improve.  Patient was given instructions and counseling regarding her condition or for health maintenance advice. Please see specific information pulled into the AVS if appropriate.

## 2023-08-21 ENCOUNTER — READMISSION MANAGEMENT (OUTPATIENT)
Dept: CALL CENTER | Facility: HOSPITAL | Age: 33
End: 2023-08-21
Payer: COMMERCIAL

## 2023-08-21 NOTE — OUTREACH NOTE
Prep Survey      Flowsheet Row Responses   [facility]? [facility]   Is LACE score < 7 ? Non- Discharge   Eligibility [facility]   Date of Discharge 08/21/23   Discharge diagnosis unknown   Does the patient have one of the following disease processes/diagnoses(primary or secondary)? Other   Prep survey completed? Yes            Damaris MALAGON - Registered Nurse

## 2023-08-22 ENCOUNTER — TRANSITIONAL CARE MANAGEMENT TELEPHONE ENCOUNTER (OUTPATIENT)
Dept: CALL CENTER | Facility: HOSPITAL | Age: 33
End: 2023-08-22
Payer: COMMERCIAL

## 2023-08-22 NOTE — OUTREACH NOTE
Call Center TCM Note      Flowsheet Row Responses   [facility] patient discharged from? Non-  [[facility]]   Does the patient have one of the following disease processes/diagnoses(primary or secondary)? Other   TCM attempt successful? Yes   Call start time 1030   Revoked Reason Other  [OB patient,   delivery.]   Call end time 1036   Discharge diagnosis  delivery   Person spoke with today (if not patient) and relationship Patient   Call end time 1036            Christine Prakash RN    2023, 10:39 EDT

## 2023-09-06 ENCOUNTER — TELEPHONE (OUTPATIENT)
Dept: FAMILY MEDICINE CLINIC | Age: 33
End: 2023-09-06

## 2023-09-06 RX ORDER — PRAMOXINE HYDROCHLORIDE HYDROCORTISONE ACETATE 100; 100 MG/10G; MG/10G
1 AEROSOL, FOAM TOPICAL 2 TIMES DAILY
Qty: 1 EACH | Refills: 0 | Status: SHIPPED | OUTPATIENT
Start: 2023-09-06 | End: 2023-09-13

## 2023-09-06 NOTE — TELEPHONE ENCOUNTER
Caller: Marissa Brantley    Relationship: Self    Best call back number: [phone]         What are your current symptoms: HEMORRHOID     How long have you been experiencing symptoms: OVER THE WEEKEND        If a prescription is needed, what is your preferred pharmacy and phone number:      [facility] - [place], [place] - [address]  - [phone]  - [phone]  [phone]     Additional notes:     THE PATIENT SAID SHE HAD A BABY 2 WEEKS AGO AND SHE HAS A HEMORRHOID THAT IS STICKING OUT. SHE IS WANTING TO KNOW IF PCP WOULD CALL IN SOME MEDICATION      THE PATIENT WOULD LIKE A CALL TO ADVISE IF THIS WILL BE DONE

## 2023-09-06 NOTE — TELEPHONE ENCOUNTER
Rx sent for Proctofoam.  Also recommended Sitz baths with commode insert that can be purchased at [facility] or similar OR she can sit in a lukewarm bathtub with the water running/circulating to soothe the hemorrhoid.  Thanks, BZTACOS

## 2023-09-28 ENCOUNTER — OFFICE VISIT (OUTPATIENT)
Dept: FAMILY MEDICINE CLINIC | Age: 33
End: 2023-09-28
Payer: COMMERCIAL

## 2023-09-28 VITALS
BODY MASS INDEX: 35.49 KG/M2 | HEIGHT: 65 IN | TEMPERATURE: 98.1 F | HEART RATE: 69 BPM | OXYGEN SATURATION: 99 % | DIASTOLIC BLOOD PRESSURE: 80 MMHG | WEIGHT: 213 LBS | SYSTOLIC BLOOD PRESSURE: 117 MMHG

## 2023-09-28 DIAGNOSIS — K64.9 HEMORRHOIDS, UNSPECIFIED HEMORRHOID TYPE: Primary | ICD-10-CM

## 2023-09-28 PROCEDURE — 99214 OFFICE O/P EST MOD 30 MIN: CPT | Performed by: NURSE PRACTITIONER

## 2023-09-28 PROCEDURE — 1160F RVW MEDS BY RX/DR IN RCRD: CPT | Performed by: NURSE PRACTITIONER

## 2023-09-28 PROCEDURE — 1159F MED LIST DOCD IN RCRD: CPT | Performed by: NURSE PRACTITIONER

## 2023-09-28 NOTE — PROGRESS NOTES
"Chief Complaint  Hemorrhoids (Foam and creams are not giving relief, hemorrhoids are now constantly bleeding and getting bigger)    Subjective          Marissa Brantley presents to [facility] for external hemorrhoids.  Patient has been to general surgeon in the past and was given some creams that helped them subside.  Patient did deliver via  6 weeks ago.  Denies constipation.  Has had bleeding with hemorrhoids.  Blood is bright red.  Denies any abdominal pain.      Objective   Vital Signs:   Vitals:    23 0954   BP: 117/80   BP Location: Right arm   Patient Position: Sitting   Cuff Size: Adult   Pulse: 69   Temp: 98.1 °F (36.7 °C)   TempSrc: Oral   SpO2: 99%   Weight: 96.6 kg (213 lb)   Height: 165.1 cm (65\")       Physical Exam  Vitals reviewed.   Constitutional:       General: She is not in acute distress.     Appearance: She is well-developed.   HENT:      Head: Normocephalic and atraumatic.   Eyes:      Conjunctiva/sclera: Conjunctivae normal.      Pupils: Pupils are equal, round, and reactive to light.   Cardiovascular:      Rate and Rhythm: Normal rate and regular rhythm.      Heart sounds: Normal heart sounds. No murmur heard.  Pulmonary:      Effort: Pulmonary effort is normal. No respiratory distress.      Breath sounds: Normal breath sounds.   Genitourinary:     Rectum: External hemorrhoid present.   Skin:     General: Skin is warm and dry.   Neurological:      Mental Status: She is alert and oriented to person, place, and time.   Psychiatric:         Mood and Affect: Mood and affect normal.         Behavior: Behavior normal.         Thought Content: Thought content normal.         Judgment: Judgment normal.        Result Review :              Assessment and Plan    Diagnoses and all orders for this visit:    1. Hemorrhoids, unspecified hemorrhoid type (Primary)  Comments:  Patient to try creams.  If they are not helpful, she is to notify office and " referral can be initiated.  Orders:  -     pramoxine-zinc (TRONOLANE) 1% rectal cream; Insert 1 application  into the rectum Every 2 (Two) Hours As Needed for Hemorrhoids.  Dispense: 28 g; Refill: 1  -     Lidocaine HCl 10 % cream; Apply to hemorrhoids every 2 hrs as needed pain  Dispense: 30 mL; Refill: 1  -     hydrocortisone 2.5 % cream; Apply 1 application  topically to the appropriate area as directed 2 (Two) Times a Day.  Dispense: 30 g; Refill: 0    Patient to notify office with any acute concerns or issues.  Patient verbalizes understanding, agrees with plan of care and has no further questions upon discharge.    Please note that portions of this note were completed with a voice recognition program.    Follow Up    Return if symptoms worsen or fail to improve.  Patient was given instructions and counseling regarding her condition or for health maintenance advice. Please see specific information pulled into the AVS if appropriate.

## 2023-12-11 NOTE — PROGRESS NOTES
"Chief Complaint  Hospital Follow Up Visit ([facility] ER 12/7/22 for back pain, was unable to walk.  )    Subjective          Marissa Brantley presents to [facility] for ED follow-up.  Patient went to ER at [facility] on 12/7/2022 for back pain.  At this time patient was not even able to walk.  Patient states it was injured while lifting boxes at work 2 days prior to ER visit on the seventh.  X-ray of L-spine was negative. Baclofen was not effective.     Pt also c/o heartburn. Used to take prilosec otc but can no longer afford.     Pt also wondering about derm appt that was placed back in October. She states she was never contacted.     Objective   Vital Signs:   Vitals:    12/12/22 1151   BP: 124/70   BP Location: Left arm   Patient Position: Sitting   Cuff Size: Large Adult   Pulse: 108   Temp: 98.4 °F (36.9 °C)   TempSrc: Oral   Weight: 111 kg (244 lb 12.8 oz)   Height: 165.1 cm (65\")       Physical Exam  Vitals reviewed.   Constitutional:       General: She is not in acute distress.     Appearance: She is well-developed.   HENT:      Head: Normocephalic and atraumatic.   Eyes:      Conjunctiva/sclera: Conjunctivae normal.      Pupils: Pupils are equal, round, and reactive to light.   Cardiovascular:      Rate and Rhythm: Normal rate and regular rhythm.      Heart sounds: Normal heart sounds. No murmur heard.  Pulmonary:      Effort: Pulmonary effort is normal. No respiratory distress.      Breath sounds: Normal breath sounds.   Musculoskeletal:         General: Tenderness present. No swelling or signs of injury.      Comments: Tenderness to right lower back   Skin:     General: Skin is warm and dry.   Neurological:      Mental Status: She is alert and oriented to person, place, and time.   Psychiatric:         Mood and Affect: Mood and affect normal.         Behavior: Behavior normal.         Thought Content: Thought content normal.         Judgment: Judgment normal.      " Subjective   Patient ID: Luna Alarcon is a 21 y.o. female who presents for New Patient Visit (Patient states period are irregular sometimes has 2 a month).  MARY ANN Carrasco is a 21-year-old G0 not sexually active resents with history of irregular cycles.  She says she started her cycle at age 11 and occasionally has skipped months but most of the time she has 2's  cycles a month.  She was told at one point that she has polycystic ovary but does not recall she was tested for this.  She says she has been trying multiple times to lose weight with dieting and exercise but unsuccessful.  LMP 11/28-30.     Review of Systems   All other systems reviewed and are negative.      Objective   Physical Exam  Constitutional:       General: Distressed: gyn.      Appearance: Normal appearance.   HENT:      Head: Normocephalic and atraumatic.   Cardiovascular:      Rate and Rhythm: Normal rate and regular rhythm.   Pulmonary:      Effort: Pulmonary effort is normal.      Breath sounds: Normal breath sounds.   Abdominal:      General: Abdomen is flat. Bowel sounds are normal.      Palpations: Abdomen is soft.   Musculoskeletal:      Cervical back: Normal range of motion and neck supple.   Skin:     General: Skin is warm and dry.   Neurological:      General: No focal deficit present.      Mental Status: She is alert.   Psychiatric:         Mood and Affect: Mood normal.         Behavior: Behavior normal.         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  I have recommended a pelvic ultrasound.  Also I recommended some blood work including thyroid and prolactin hormone levels.  Follow-up after all the above are done.  Follow up telehealth after above resulted.          David Chávez MD 12/11/23 9:37 AM        Result Review :              Assessment and Plan    Diagnoses and all orders for this visit:    1. Acute midline low back pain with right-sided sciatica (Primary)  Comments:  stop naproxen while taking steroid.  Alternate heat and ice.  Return to work with weight restriction.  Zanaflex as needed.  Orders:  -     tiZANidine (Zanaflex) 4 MG tablet; Take 1 tablet by mouth Every 6 (Six) Hours As Needed for Muscle Spasms.  Dispense: 30 tablet; Refill: 0  -     predniSONE (DELTASONE) 20 MG tablet; Take 1 tablet by mouth Daily.  Dispense: 5 tablet; Refill: 0    2. Gastroesophageal reflux disease, unspecified whether esophagitis present  Comments:  Starting prilosec 40mg. Weight loss encouraged. f/u in 3months.  Orders:  -     Discontinue: omeprazole (priLOSEC) 40 MG capsule; Take 1 capsule by mouth Daily.  Dispense: 30 capsule; Refill: 1  -     omeprazole (priLOSEC) 40 MG capsule; Take 1 capsule by mouth Daily.  Dispense: 90 capsule; Refill: 0    3. Psoriasis  Comments:  Taken to referrals window to have appt rescheduled.     Patient to notify office with any acute concerns or issues.  Patient verbalizes understanding, agrees with plan of care and has no further questions upon discharge.    Please note that portions of this note were completed with a voice recognition program.  Follow Up    Return if symptoms worsen or fail to improve.  Patient was given instructions and counseling regarding her condition or for health maintenance advice. Please see specific information pulled into the AVS if appropriate.

## 2024-01-23 ENCOUNTER — OFFICE VISIT (OUTPATIENT)
Dept: FAMILY MEDICINE CLINIC | Age: 34
End: 2024-01-23
Payer: COMMERCIAL

## 2024-01-23 VITALS
DIASTOLIC BLOOD PRESSURE: 89 MMHG | SYSTOLIC BLOOD PRESSURE: 124 MMHG | HEART RATE: 88 BPM | BODY MASS INDEX: 37.32 KG/M2 | TEMPERATURE: 98.2 F | HEIGHT: 65 IN | OXYGEN SATURATION: 98 % | WEIGHT: 224 LBS

## 2024-01-23 DIAGNOSIS — M54.42 CHRONIC LEFT-SIDED LOW BACK PAIN WITH LEFT-SIDED SCIATICA: Primary | ICD-10-CM

## 2024-01-23 DIAGNOSIS — M54.6 CHRONIC LEFT-SIDED THORACIC BACK PAIN: ICD-10-CM

## 2024-01-23 DIAGNOSIS — G89.29 CHRONIC LEFT-SIDED LOW BACK PAIN WITH LEFT-SIDED SCIATICA: Primary | ICD-10-CM

## 2024-01-23 DIAGNOSIS — G89.29 CHRONIC LEFT-SIDED THORACIC BACK PAIN: ICD-10-CM

## 2024-01-23 PROCEDURE — 99214 OFFICE O/P EST MOD 30 MIN: CPT | Performed by: NURSE PRACTITIONER

## 2024-01-23 PROCEDURE — 1159F MED LIST DOCD IN RCRD: CPT | Performed by: NURSE PRACTITIONER

## 2024-01-23 PROCEDURE — 1160F RVW MEDS BY RX/DR IN RCRD: CPT | Performed by: NURSE PRACTITIONER

## 2024-01-23 RX ORDER — IBUPROFEN 800 MG/1
800 TABLET ORAL EVERY 6 HOURS PRN
Qty: 20 TABLET | Refills: 1 | Status: SHIPPED | OUTPATIENT
Start: 2024-01-23

## 2024-01-23 RX ORDER — TIZANIDINE 2 MG/1
2 TABLET ORAL EVERY 8 HOURS PRN
Qty: 15 TABLET | Refills: 0 | Status: SHIPPED | OUTPATIENT
Start: 2024-01-23

## 2024-01-23 RX ORDER — METHYLPREDNISOLONE 4 MG/1
TABLET ORAL
Qty: 21 EACH | Refills: 0 | Status: SHIPPED | OUTPATIENT
Start: 2024-01-23
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
## 2024-02-28 ENCOUNTER — TELEPHONE (OUTPATIENT)
Dept: FAMILY MEDICINE CLINIC | Age: 34
End: 2024-02-28
Payer: COMMERCIAL

## 2024-03-12 ENCOUNTER — OFFICE VISIT (OUTPATIENT)
Dept: FAMILY MEDICINE CLINIC | Age: 34
End: 2024-03-12
Payer: COMMERCIAL

## 2024-03-12 VITALS
WEIGHT: 224.6 LBS | HEIGHT: 65 IN | OXYGEN SATURATION: 97 % | BODY MASS INDEX: 37.42 KG/M2 | SYSTOLIC BLOOD PRESSURE: 118 MMHG | TEMPERATURE: 97.4 F | HEART RATE: 84 BPM | DIASTOLIC BLOOD PRESSURE: 80 MMHG

## 2024-03-12 DIAGNOSIS — L40.9 PSORIASIS: Primary | ICD-10-CM

## 2024-03-12 PROCEDURE — 1159F MED LIST DOCD IN RCRD: CPT | Performed by: NURSE PRACTITIONER

## 2024-03-12 PROCEDURE — 99213 OFFICE O/P EST LOW 20 MIN: CPT | Performed by: NURSE PRACTITIONER

## 2024-03-12 PROCEDURE — 1160F RVW MEDS BY RX/DR IN RCRD: CPT | Performed by: NURSE PRACTITIONER

## 2024-03-12 RX ORDER — ACETAMINOPHEN 500 MG
1000 TABLET ORAL DAILY
COMMUNITY

## 2024-03-12 NOTE — PROGRESS NOTES
"Chief Complaint  Rash (Red, dry skin, itchy spots on skin. Started 5 yrs ago, went away while pregnant, but came back after baby was born)    Subjective          Marissa Brantley presents to [facility] for red, itchy, dry rash to entire body. Worse on elbows. Has been to derm in 2022 and had biopsy. She became pregnant and did not follow up. She has been using lotion to help with symptoms. Had child 5 months ago. Is not nursing.       Objective   Vital Signs:   Vitals:    03/12/24 1301   BP: 118/80   BP Location: Right arm   Patient Position: Sitting   Cuff Size: Large Adult   Pulse: 84   Temp: 97.4 °F (36.3 °C)   TempSrc: Oral   SpO2: 97%   Weight: 102 kg (224 lb 9.6 oz)   Height: 165.1 cm (65\")       Body mass index is 37.38 kg/m².    Physical Exam  Vitals reviewed.   Constitutional:       General: She is not in acute distress.     Appearance: Normal appearance. She is well-developed.   HENT:      Head: Normocephalic and atraumatic.   Eyes:      Conjunctiva/sclera: Conjunctivae normal.      Pupils: Pupils are equal, round, and reactive to light.   Cardiovascular:      Rate and Rhythm: Normal rate and regular rhythm.      Heart sounds: Normal heart sounds. No murmur heard.  Pulmonary:      Effort: Pulmonary effort is normal. No respiratory distress.      Breath sounds: Normal breath sounds.   Skin:     General: Skin is warm and dry.      Comments: White, dry patches to elbow. Small areas of erythema.    Neurological:      Mental Status: She is alert and oriented to person, place, and time.   Psychiatric:         Mood and Affect: Mood and affect normal.         Behavior: Behavior normal.         Thought Content: Thought content normal.         Judgment: Judgment normal.                     Assessment and Plan    Assessment & Plan  Psoriasis  Lotion and shampoo rx. Referral initiated.     Orders Placed This Encounter   Procedures    Ambulatory Referral to Dermatology     New " Medications Ordered This Visit   Medications    clobetasol (CLOBEX) 0.05 % lotion     Sig: Apply  topically to the appropriate area as directed 2 (Two) Times a Day.     Dispense:  118 mL     Refill:  0    clobetasol propionate (CLOBEX) 0.05 % shampoo     Sig: Apply  topically to the appropriate area as directed Daily.     Dispense:  118 mL     Refill:  0         Patient to notify office with any acute concerns or issues.  Patient verbalizes understanding, agrees with plan of care and has no further questions upon discharge.    Please note that portions of this note were completed with a voice recognition program.      Follow Up    Return if symptoms worsen or fail to improve.  Patient was given instructions and counseling regarding her condition or for health maintenance advice. Please see specific information pulled into the AVS if appropriate.

## 2024-03-13 RX ORDER — CLOBETASOL PROPIONATE 0.05 G/100ML
SHAMPOO TOPICAL DAILY
Qty: 118 ML | Refills: 0 | Status: SHIPPED | OUTPATIENT
Start: 2024-03-13

## 2024-03-13 RX ORDER — CLOBETASOL PROPIONATE 0.5 MG/ML
LOTION TOPICAL 2 TIMES DAILY
Qty: 118 ML | Refills: 0 | Status: SHIPPED | OUTPATIENT
Start: 2024-03-13

## 2024-03-15 ENCOUNTER — PRIOR AUTHORIZATION (OUTPATIENT)
Dept: FAMILY MEDICINE CLINIC | Age: 34
End: 2024-03-15
Payer: COMMERCIAL

## 2024-03-15 NOTE — TELEPHONE ENCOUNTER
PA for Clobetasol Propionate lotion sent to plan via [employer]    Key: [id number]

## 2024-03-18 ENCOUNTER — TELEPHONE (OUTPATIENT)
Dept: FAMILY MEDICINE CLINIC | Age: 34
End: 2024-03-18
Payer: COMMERCIAL

## 2024-03-18 NOTE — TELEPHONE ENCOUNTER
Caller: Marissa Brantley    Relationship: Self    Best call back number: [phone]     What medication are you requesting: MUSCLE RELAXER    What are your current symptoms: BACK PAIN, CAN BARELY GET OUT OF BED    How long have you been experiencing symptoms: 3 DAYS    Have you had these symptoms before:    [] Yes  [] No    Have you been treated for these symptoms before:   [] Yes  [] No    If a prescription is needed, what is your preferred pharmacy and phone number: [facility] - [place], [place] - [address] [facility] [phone] [facility] [phone] FX     Additional notes: PATIENT STATES THAT DURING HER LAST VISIT, SHE DISCUSSED MUSCLE RELAXER WITH HER PHYSICIAN DUE TO HER BACK BOTHERING HER. PATIENT WENT TO PHARMACY AND MEDICATION WAS NEVER CALLED IN. PATIENT STATES THAT SHE CAN BARELY GET OUT OF BED TODAY.

## 2024-03-19 RX ORDER — TIZANIDINE 4 MG/1
4 TABLET ORAL EVERY 6 HOURS PRN
Qty: 30 TABLET | Refills: 0 | Status: SHIPPED | OUTPATIENT
Start: 2024-03-19

## 2024-03-19 NOTE — TELEPHONE ENCOUNTER
Muscle relaxer sent in to [facility]. It can cause sedation. Do not cosleep with children/operate heavy machinery. Can also take tylenol/ibuprofen. Alternate heat and ice. Activity as tolerated.

## 2024-08-09 ENCOUNTER — OFFICE VISIT (OUTPATIENT)
Dept: FAMILY MEDICINE CLINIC | Age: 34
End: 2024-08-09
Payer: COMMERCIAL

## 2024-08-09 ENCOUNTER — LAB (OUTPATIENT)
Dept: LAB | Facility: HOSPITAL | Age: 34
End: 2024-08-09
Payer: COMMERCIAL

## 2024-08-09 VITALS
OXYGEN SATURATION: 100 % | DIASTOLIC BLOOD PRESSURE: 63 MMHG | BODY MASS INDEX: 37.59 KG/M2 | SYSTOLIC BLOOD PRESSURE: 127 MMHG | HEIGHT: 65 IN | HEART RATE: 74 BPM | TEMPERATURE: 97.8 F | WEIGHT: 225.6 LBS

## 2024-08-09 DIAGNOSIS — R07.9 CHEST PAIN, UNSPECIFIED TYPE: Primary | ICD-10-CM

## 2024-08-09 DIAGNOSIS — N92.1 MENORRHAGIA WITH IRREGULAR CYCLE: ICD-10-CM

## 2024-08-09 DIAGNOSIS — D50.0 IRON DEFICIENCY ANEMIA DUE TO CHRONIC BLOOD LOSS: ICD-10-CM

## 2024-08-09 LAB
ALBUMIN SERPL-MCNC: 4.3 G/DL (ref 3.5–5.2)
ALBUMIN/GLOB SERPL: 1.8 G/DL
ALP SERPL-CCNC: 57 U/L (ref 39–117)
ALT SERPL W P-5'-P-CCNC: 11 U/L (ref 1–33)
ANION GAP SERPL CALCULATED.3IONS-SCNC: 11 MMOL/L (ref 5–15)
AST SERPL-CCNC: 9 U/L (ref 1–32)
BASOPHILS # BLD AUTO: 0.09 10*3/MM3 (ref 0–0.2)
BASOPHILS NFR BLD AUTO: 0.8 % (ref 0–1.5)
BILIRUB SERPL-MCNC: <0.2 MG/DL (ref 0–1.2)
BUN SERPL-MCNC: 10 MG/DL (ref 6–20)
BUN/CREAT SERPL: 13.2 (ref 7–25)
CALCIUM SPEC-SCNC: 9.1 MG/DL (ref 8.6–10.5)
CHLORIDE SERPL-SCNC: 107 MMOL/L (ref 98–107)
CO2 SERPL-SCNC: 22 MMOL/L (ref 22–29)
CREAT SERPL-MCNC: 0.76 MG/DL (ref 0.57–1)
DEPRECATED RDW RBC AUTO: 45.4 FL (ref 37–54)
EGFRCR SERPLBLD CKD-EPI 2021: 106.3 ML/MIN/1.73
EOSINOPHIL # BLD AUTO: 0.28 10*3/MM3 (ref 0–0.4)
EOSINOPHIL NFR BLD AUTO: 2.6 % (ref 0.3–6.2)
ERYTHROCYTE [DISTWIDTH] IN BLOOD BY AUTOMATED COUNT: 14 % (ref 12.3–15.4)
FERRITIN SERPL-MCNC: 6.32 NG/ML (ref 13–150)
GLOBULIN UR ELPH-MCNC: 2.4 GM/DL
GLUCOSE SERPL-MCNC: 87 MG/DL (ref 65–99)
HCT VFR BLD AUTO: 28.6 % (ref 34–46.6)
HGB BLD-MCNC: 8.7 G/DL (ref 12–15.9)
IMM GRANULOCYTES # BLD AUTO: 0.01 10*3/MM3 (ref 0–0.05)
IMM GRANULOCYTES NFR BLD AUTO: 0.1 % (ref 0–0.5)
IRON 24H UR-MRATE: 15 MCG/DL (ref 37–145)
IRON SATN MFR SERPL: 3 % (ref 20–50)
LYMPHOCYTES # BLD AUTO: 3.01 10*3/MM3 (ref 0.7–3.1)
LYMPHOCYTES NFR BLD AUTO: 28.1 % (ref 19.6–45.3)
MCH RBC QN AUTO: 26.8 PG (ref 26.6–33)
MCHC RBC AUTO-ENTMCNC: 30.4 G/DL (ref 31.5–35.7)
MCV RBC AUTO: 88 FL (ref 79–97)
MONOCYTES # BLD AUTO: 0.49 10*3/MM3 (ref 0.1–0.9)
MONOCYTES NFR BLD AUTO: 4.6 % (ref 5–12)
NEUTROPHILS NFR BLD AUTO: 6.83 10*3/MM3 (ref 1.7–7)
NEUTROPHILS NFR BLD AUTO: 63.8 % (ref 42.7–76)
PLATELET # BLD AUTO: 277 10*3/MM3 (ref 140–450)
PMV BLD AUTO: 10.8 FL (ref 6–12)
POTASSIUM SERPL-SCNC: 4 MMOL/L (ref 3.5–5.2)
PROT SERPL-MCNC: 6.7 G/DL (ref 6–8.5)
RBC # BLD AUTO: 3.25 10*6/MM3 (ref 3.77–5.28)
SODIUM SERPL-SCNC: 140 MMOL/L (ref 136–145)
TIBC SERPL-MCNC: 483 MCG/DL (ref 298–536)
TRANSFERRIN SERPL-MCNC: 324 MG/DL (ref 200–360)
WBC NRBC COR # BLD AUTO: 10.71 10*3/MM3 (ref 3.4–10.8)

## 2024-08-09 PROCEDURE — 84466 ASSAY OF TRANSFERRIN: CPT

## 2024-08-09 PROCEDURE — 80053 COMPREHEN METABOLIC PANEL: CPT

## 2024-08-09 PROCEDURE — 83540 ASSAY OF IRON: CPT

## 2024-08-09 PROCEDURE — 82728 ASSAY OF FERRITIN: CPT

## 2024-08-09 PROCEDURE — 85025 COMPLETE CBC W/AUTO DIFF WBC: CPT

## 2024-08-09 PROCEDURE — 36415 COLL VENOUS BLD VENIPUNCTURE: CPT

## 2024-08-09 PROCEDURE — 93000 ELECTROCARDIOGRAM COMPLETE: CPT | Performed by: NURSE PRACTITIONER

## 2024-08-09 PROCEDURE — 99214 OFFICE O/P EST MOD 30 MIN: CPT | Performed by: NURSE PRACTITIONER

## 2024-08-09 RX ORDER — IBUPROFEN 800 MG/1
1 TABLET ORAL EVERY 6 HOURS
COMMUNITY
Start: 2024-04-15

## 2024-08-09 RX ORDER — ONDANSETRON 4 MG/1
4 TABLET, ORALLY DISINTEGRATING ORAL EVERY 8 HOURS PRN
Qty: 30 TABLET | Refills: 0 | Status: SHIPPED | OUTPATIENT
Start: 2024-08-09

## 2024-08-09 NOTE — PROGRESS NOTES
Procedure     ECG 12 Lead    Date/Time: 8/9/2024 5:40 PM  Performed by: Brenda Hardy MD    Authorized by: Yary Michaels APRN  Comparison: not compared with previous ECG   Previous ECG: no previous ECG available  Rhythm: sinus rhythm  Rate: normal  BPM: 80  Conduction: conduction normal  ST Segments: ST segments normal  T Waves: T waves normal  T inversion: V1 (normal variant)  QRS axis: normal  Other: no other findings    Clinical impression: normal ECG  Comments: Normal EKG.  TACOS

## 2024-08-09 NOTE — PROGRESS NOTES
"Chief Complaint  Menstrual Problem (Periods lasting for a month/Hemoglobin levels low, discuss getting more blood work /) and Chest Pain (Chest pain, dizziness, X 1 week)    Subjective          Marissa Brantley presents to [facility] for heavy periods. She had heavy bleeding for over a month. She is in a study with dermatology and has blood drawn routinely. Her hemoglobin was 8.6. Bleeding has been intermittent in the last 2 weeks. She states she has been having dizziness, soa and chest pains with activity. Denies these symptoms at this time. She has appt with gynecology next week.     Objective   Vital Signs:   Vitals:    08/09/24 1322   BP: 127/63   Pulse: 74   Temp: 97.8 °F (36.6 °C)   TempSrc: Oral   SpO2: 100%   Weight: 102 kg (225 lb 9.6 oz)   Height: 165.1 cm (65\")       Body mass index is 37.54 kg/m².    Physical Exam  Vitals reviewed.   Constitutional:       General: She is not in acute distress.     Appearance: She is well-developed.   HENT:      Head: Normocephalic and atraumatic.   Eyes:      Conjunctiva/sclera: Conjunctivae normal.      Pupils: Pupils are equal, round, and reactive to light.   Cardiovascular:      Rate and Rhythm: Normal rate and regular rhythm.      Heart sounds: Normal heart sounds. No murmur heard.  Pulmonary:      Effort: Pulmonary effort is normal. No respiratory distress.      Breath sounds: Normal breath sounds.   Skin:     General: Skin is warm and dry.   Neurological:      Mental Status: She is alert and oriented to person, place, and time.   Psychiatric:         Mood and Affect: Mood and affect normal.         Behavior: Behavior normal.         Thought Content: Thought content normal.         Judgment: Judgment normal.                     Assessment and Plan    Assessment & Plan  Chest pain, unspecified type  NSR on ECG. Denies chest pain at this time.   Menorrhagia with irregular cycle  Intermittent bleeding at this point. Keep scheduled " "f/u with gynecology. Zofran as needed for nausea.   Iron deficiency anemia due to chronic blood loss  Pt states she is unable to tolerate any type of oral iron supplement. That she will \"projectile vomit\". Hematology appt has been ordered.     Orders Placed This Encounter   Procedures    CBC Auto Differential    Iron Profile    Ferritin    Comprehensive metabolic panel    Ambulatory Referral to Hematology / Oncology    ECG 12 Lead     New Medications Ordered This Visit   Medications    ondansetron ODT (ZOFRAN-ODT) 4 MG disintegrating tablet     Sig: Place 1 tablet on the tongue Every 8 (Eight) Hours As Needed for Nausea.     Dispense:  30 tablet     Refill:  0         Patient to notify office with any acute concerns or issues.  Patient verbalizes understanding, agrees with plan of care and has no further questions upon discharge.    Please note that portions of this note were completed with a voice recognition program.      Follow Up    No follow-ups on file.  Patient was given instructions and counseling regarding her condition or for health maintenance advice. Please see specific information pulled into the AVS if appropriate.     Medications Discontinued During This Encounter   Medication Reason    acetaminophen (TYLENOL) 500 MG tablet Historical Med - Therapy completed    clobetasol (CLOBEX) 0.05 % lotion Historical Med - Therapy completed    clobetasol propionate (CLOBEX) 0.05 % shampoo Historical Med - Therapy completed     "

## 2024-08-15 ENCOUNTER — TELEPHONE (OUTPATIENT)
Dept: FAMILY MEDICINE CLINIC | Age: 34
End: 2024-08-15
Payer: COMMERCIAL

## 2024-08-15 NOTE — TELEPHONE ENCOUNTER
Caller: Marissa Brantley    Relationship: Self    Best call back number: [phone]    What is the best time to reach you: ANYTIME    Who are you requesting to speak with (clinical staff, provider,  specific staff member): NURSE       What was the call regarding: PATIENT WOULD LIKE A CALL BACK TO GO OVER LAB RESULTS WITH HER.

## 2025-03-12 ENCOUNTER — TELEPHONE (OUTPATIENT)
Dept: FAMILY MEDICINE CLINIC | Age: 35
End: 2025-03-12
Payer: COMMERCIAL

## 2025-03-12 NOTE — TELEPHONE ENCOUNTER
Yary does prescribe weight loss medications.  Not sure what Patient is asking for.  Please just make her an appt with Yary and if she needs a specialist appt Yary can refer her.

## 2025-03-12 NOTE — TELEPHONE ENCOUNTER
Caller: Marissa Brantley    Relationship: Self    Best call back number: [phone]     Who is your current provider: KARO KESSLER     Is your current provider offboarding? NO    Who would you like your new provider to be: SOMEONE WHO SPECIALIZES IN WEIGHT LOSS MANAGEMENT     What are your reasons for transferring care: PATIENT STATES SHE WOULD LIKE A PROVIDER WHO SPECIALIZES MORE IN WHAT SHE IS NEEDING.    Additional notes: PATIENT STATES SHE DOES NOT HAVE ANY SPECIFIC PROVIDER IN MIND.

## 2025-03-18 ENCOUNTER — HOSPITAL ENCOUNTER (OUTPATIENT)
Dept: GENERAL RADIOLOGY | Facility: HOSPITAL | Age: 35
Discharge: HOME | End: 2025-03-18
Admitting: NURSE PRACTITIONER
Payer: COMMERCIAL

## 2025-03-18 ENCOUNTER — LAB (OUTPATIENT)
Dept: LAB | Facility: HOSPITAL | Age: 35
End: 2025-03-18
Payer: COMMERCIAL

## 2025-03-18 ENCOUNTER — OFFICE VISIT (OUTPATIENT)
Dept: FAMILY MEDICINE CLINIC | Age: 35
End: 2025-03-18
Payer: COMMERCIAL

## 2025-03-18 VITALS
DIASTOLIC BLOOD PRESSURE: 74 MMHG | TEMPERATURE: 98.6 F | HEIGHT: 65 IN | WEIGHT: 234 LBS | SYSTOLIC BLOOD PRESSURE: 118 MMHG | BODY MASS INDEX: 38.99 KG/M2 | HEART RATE: 79 BPM | OXYGEN SATURATION: 98 %

## 2025-03-18 DIAGNOSIS — B37.2 CANDIDOSIS OF SKIN: ICD-10-CM

## 2025-03-18 DIAGNOSIS — E66.812 CLASS 2 SEVERE OBESITY DUE TO EXCESS CALORIES WITH SERIOUS COMORBIDITY AND BODY MASS INDEX (BMI) OF 38.0 TO 38.9 IN ADULT: ICD-10-CM

## 2025-03-18 DIAGNOSIS — Z87.891 PERSONAL HISTORY OF TOBACCO USE, PRESENTING HAZARDS TO HEALTH: ICD-10-CM

## 2025-03-18 DIAGNOSIS — E66.01 CLASS 2 SEVERE OBESITY DUE TO EXCESS CALORIES WITH SERIOUS COMORBIDITY AND BODY MASS INDEX (BMI) OF 38.0 TO 38.9 IN ADULT: ICD-10-CM

## 2025-03-18 DIAGNOSIS — Z11.59 ENCOUNTER FOR SCREENING FOR VIRAL DISEASE: Primary | ICD-10-CM

## 2025-03-18 DIAGNOSIS — M79.671 RIGHT FOOT PAIN: ICD-10-CM

## 2025-03-18 PROCEDURE — 73630 X-RAY EXAM OF FOOT: CPT

## 2025-03-18 RX ORDER — CLOTRIMAZOLE AND BETAMETHASONE DIPROPIONATE 10; .64 MG/G; MG/G
1 CREAM TOPICAL 2 TIMES DAILY PRN
Qty: 45 EACH | Refills: 0 | Status: SHIPPED | OUTPATIENT
Start: 2025-03-18 | End: 2025-04-01

## 2025-03-18 RX ORDER — VARENICLINE TARTRATE 1 MG/1
1 TABLET, FILM COATED ORAL 2 TIMES DAILY
Qty: 60 TABLET | Refills: 1 | Status: SHIPPED | OUTPATIENT
Start: 2025-03-18

## 2025-03-18 RX ORDER — VARENICLINE TARTRATE 0.5 (11)-1
KIT ORAL
Qty: 1 EACH | Refills: 0 | Status: SHIPPED | OUTPATIENT
Start: 2025-03-18 | End: 2025-04-15

## 2025-03-18 NOTE — ASSESSMENT & PLAN NOTE
Patient's (Body mass index is 38.94 kg/m².) indicates that they are obese with health conditions that include none . Weight is 234. BMI 38.94. We discussed routine exercise, referral to dietician, meds not covered by insurance and why I am unable to rx phentermine. Strongly encouraged pt to focus on diet, exercises and smoking cessation.      If Ha1c >6.5, will consider GLP1 to help with DM and weight loss.     Orders:    CBC Auto Differential; Future    TSH Rfx On Abnormal To Free T4; Future    Lipid Panel; Future    Comprehensive Metabolic Panel; Future    Hemoglobin A1c; Future    Ambulatory Referral to Nutrition Services    Ambulatory Referral to Bariatric Surgery

## 2025-03-18 NOTE — ASSESSMENT & PLAN NOTE
Keep area clean and dry. Lotrisone as rx.   Orders:    clotrimazole-betamethasone (LOTRISONE) 1-0.05 % cream; Apply 1 Application topically to the appropriate area as directed 2 (Two) Times a Day As Needed (rash) for up to 14 days.     NOTIFICATION RETURN TO WORK / SCHOOL 
 
3/18/2019 3:20 PM 
 
Mr. Nadine Butt [address] [address] To Whom It May Concern: 
 
Nadine Butt is currently under the care of [facility]. He will return to work/school on: 3/20/19 If there are questions or concerns please have the patient contact our office.  
 
 
 
Sincerely, 
 
 
Leona Davenport MD

## 2025-03-18 NOTE — PROGRESS NOTES
"Chief Complaint  Foot Pain (Medial right foot pain w/ weight  x 1 year) and Weight Management (Would like to discuss weight loss options)    Subjective          Marissa Brantley presents to [facility] for an acute visit complaining of right foot pain for the last year. Hurts worse bearing weight. No known injury.     Pt would like to discuss weight loss options.  Unfortunately patient has Medicaid and they do not cover weight loss meds.  She has history of methamphetamine abuse.  Has been sober since 2021.  I would not feel comfortable starting her on phentermine for this reason.  Patient states she just bought some land and has been very active.  Would possibly be interested and bariatric surgery, but she smokes 1/4ppd since age 15.  She is aware that they would not do surgery if patient is smoking.  Has tried Wellbutrin in the past and it was not helpful for smoking cessation.    Patient is also complaining of rash to her abdominal fold.  Patient has had 3 C-sections.    Objective   Vital Signs:   Vitals:    03/18/25 1245   BP: 118/74   Pulse: 79   Temp: 98.6 °F (37 °C)   TempSrc: Oral   SpO2: 98%   Weight: 106 kg (234 lb)   Height: 165.1 cm (65\")       Body mass index is 38.94 kg/m².         Physical Exam  Vitals reviewed.   Constitutional:       General: She is not in acute distress.     Appearance: She is well-developed. She is obese.   HENT:      Head: Normocephalic and atraumatic.   Eyes:      Conjunctiva/sclera: Conjunctivae normal.      Pupils: Pupils are equal, round, and reactive to light.   Cardiovascular:      Rate and Rhythm: Normal rate and regular rhythm.      Heart sounds: Normal heart sounds. No murmur heard.  Pulmonary:      Effort: Pulmonary effort is normal. No respiratory distress.      Breath sounds: Normal breath sounds.   Skin:     General: Skin is warm and dry.      Findings: Erythema present.      Comments: Erythema to abdominal fold   Neurological: "      Mental Status: She is alert and oriented to person, place, and time.   Psychiatric:         Mood and Affect: Mood and affect normal.         Behavior: Behavior normal.         Thought Content: Thought content normal.         Judgment: Judgment normal.                        Assessment and Plan    Assessment & Plan  Encounter for screening for viral disease  Will notify patient of results and treat accordingly.     Orders:    Hepatitis C Antibody; Future    Class 2 severe obesity due to excess calories with serious comorbidity and body mass index (BMI) of 38.0 to 38.9 in adult  Patient's (Body mass index is 38.94 kg/m².) indicates that they are obese with health conditions that include none . Weight is 234. BMI 38.94. We discussed routine exercise, referral to dietician, meds not covered by insurance and why I am unable to rx phentermine. Strongly encouraged pt to focus on diet, exercises and smoking cessation.      If Ha1c >6.5, will consider GLP1 to help with DM and weight loss.     Orders:    CBC Auto Differential; Future    TSH Rfx On Abnormal To Free T4; Future    Lipid Panel; Future    Comprehensive Metabolic Panel; Future    Hemoglobin A1c; Future    Ambulatory Referral to Nutrition Services    Ambulatory Referral to Bariatric Surgery    Right foot pain  Will notify patient of results and treat accordingly.   Referral initiated    Orders:    XR Foot 3+ View Right; Future    Ambulatory Referral to Podiatry    Personal history of tobacco use, presenting hazards to health  Starting chantix. Potential side effects of medication discussed.     Orders:    Varenicline Tartrate, Starter, 0.5 MG X 11 & 1 MG X 42 tablet therapy pack; Take 0.5 mg by mouth Daily for 3 days, THEN 0.5 mg Every 12 (Twelve) Hours for 4 days, THEN 1 mg Every 12 (Twelve) Hours for 21 days.    varenicline (Chantix Continuing Month Kojo) 1 MG tablet; Take 1 tablet by mouth 2 (Two) Times a Day.    Candidosis of skin  Keep area clean and dry.  Lotrisone as rx.   Orders:    clotrimazole-betamethasone (LOTRISONE) 1-0.05 % cream; Apply 1 Application topically to the appropriate area as directed 2 (Two) Times a Day As Needed (rash) for up to 14 days.             Patient to notify office with any acute concerns or issues.  Patient verbalizes understanding, agrees with plan of care and has no further questions upon discharge.    Please note that portions of this note were completed with a voice recognition program.      Follow Up    Return in about 4 weeks (around 4/15/2025) for Recheck.  Patient was given instructions and counseling regarding her condition or for health maintenance advice. Please see specific information pulled into the AVS if appropriate.       Current Outpatient Medications:     ibuprofen (ADVIL,MOTRIN) 800 MG tablet, Take 1 tablet by mouth As Needed., Disp: , Rfl:     clotrimazole-betamethasone (LOTRISONE) 1-0.05 % cream, Apply 1 Application topically to the appropriate area as directed 2 (Two) Times a Day As Needed (rash) for up to 14 days., Disp: 45 each, Rfl: 0    varenicline (Chantix Continuing Month Kojo) 1 MG tablet, Take 1 tablet by mouth 2 (Two) Times a Day., Disp: 60 tablet, Rfl: 1    Varenicline Tartrate, Starter, 0.5 MG X 11 & 1 MG X 42 tablet therapy pack, Take 0.5 mg by mouth Daily for 3 days, THEN 0.5 mg Every 12 (Twelve) Hours for 4 days, THEN 1 mg Every 12 (Twelve) Hours for 21 days., Disp: 1 each, Rfl: 0  Medications Discontinued During This Encounter   Medication Reason    tiZANidine (Zanaflex) 4 MG tablet Historical Med - Therapy completed    ondansetron ODT (ZOFRAN-ODT) 4 MG disintegrating tablet Historical Med - Therapy completed

## 2025-03-18 NOTE — ASSESSMENT & PLAN NOTE
Will notify patient of results and treat accordingly.   Referral initiated    Orders:    XR Foot 3+ View Right; Future    Ambulatory Referral to Podiatry

## 2025-03-18 NOTE — ASSESSMENT & PLAN NOTE
Starting chantix. Potential side effects of medication discussed.     Orders:    Varenicline Tartrate, Starter, 0.5 MG X 11 & 1 MG X 42 tablet therapy pack; Take 0.5 mg by mouth Daily for 3 days, THEN 0.5 mg Every 12 (Twelve) Hours for 4 days, THEN 1 mg Every 12 (Twelve) Hours for 21 days.    varenicline (Chantix Continuing Month Kojo) 1 MG tablet; Take 1 tablet by mouth 2 (Two) Times a Day.

## 2025-03-19 ENCOUNTER — TELEPHONE (OUTPATIENT)
Dept: FAMILY MEDICINE CLINIC | Age: 35
End: 2025-03-19
Payer: COMMERCIAL

## 2025-03-19 NOTE — TELEPHONE ENCOUNTER
Caller: Marissa Brantley    Relationship: Self    Best call back number: [phone]     What medication are you requesting: WELLBUTRIN      If a prescription is needed, what is your preferred pharmacy and phone number:    [facility] - [place], [place] - [address] [facility] - [phone] [facility] [phone] FX   Additional notes:PATIENT STATES PROVIDER WAS SUPPOSED TO CALL THIS IN AS WELL TO DEAL WITH DEPRESSION WHILE QUITTING TOBACCO

## 2025-03-20 RX ORDER — BUPROPION HYDROCHLORIDE 150 MG/1
150 TABLET, EXTENDED RELEASE ORAL 2 TIMES DAILY
Qty: 60 TABLET | Refills: 1 | Status: SHIPPED | OUTPATIENT
Start: 2025-03-20

## 2025-03-20 NOTE — TELEPHONE ENCOUNTER
Spoke to Patient and she wants the Wellbutrin for her depression.  She does not have a hx of seizures.

## 2025-04-07 ENCOUNTER — TELEPHONE (OUTPATIENT)
Dept: FAMILY MEDICINE CLINIC | Age: 35
End: 2025-04-07
Payer: COMMERCIAL

## 2025-05-15 ENCOUNTER — OFFICE VISIT (OUTPATIENT)
Dept: PODIATRY | Facility: CLINIC | Age: 35
End: 2025-05-15
Payer: COMMERCIAL

## 2025-05-15 VITALS
HEART RATE: 98 BPM | DIASTOLIC BLOOD PRESSURE: 67 MMHG | HEIGHT: 65 IN | BODY MASS INDEX: 38.82 KG/M2 | SYSTOLIC BLOOD PRESSURE: 119 MMHG | WEIGHT: 233 LBS | OXYGEN SATURATION: 100 %

## 2025-05-15 DIAGNOSIS — M54.17 LUMBOSACRAL RADICULOPATHY: ICD-10-CM

## 2025-05-15 DIAGNOSIS — M72.2 PLANTAR FASCIITIS: Primary | ICD-10-CM

## 2025-05-15 PROCEDURE — 99203 OFFICE O/P NEW LOW 30 MIN: CPT | Performed by: PODIATRIST

## 2025-05-15 PROCEDURE — 20550 NJX 1 TENDON SHEATH/LIGAMENT: CPT | Performed by: PODIATRIST

## 2025-05-15 PROCEDURE — 1159F MED LIST DOCD IN RCRD: CPT | Performed by: PODIATRIST

## 2025-05-15 PROCEDURE — 1160F RVW MEDS BY RX/DR IN RCRD: CPT | Performed by: PODIATRIST

## 2025-05-15 RX ORDER — BUPIVACAINE HYDROCHLORIDE 5 MG/ML
5 INJECTION, SOLUTION PERINEURAL ONCE
Status: COMPLETED | OUTPATIENT
Start: 2025-05-15 | End: 2025-05-15

## 2025-05-15 RX ORDER — TRIAMCINOLONE ACETONIDE 40 MG/ML
20 INJECTION, SUSPENSION INTRA-ARTICULAR; INTRAMUSCULAR ONCE
Status: COMPLETED | OUTPATIENT
Start: 2025-05-15 | End: 2025-05-15

## 2025-05-15 RX ADMIN — TRIAMCINOLONE ACETONIDE 20 MG: 40 INJECTION, SUSPENSION INTRA-ARTICULAR; INTRAMUSCULAR at 11:16

## 2025-05-15 RX ADMIN — BUPIVACAINE HYDROCHLORIDE 5 ML: 5 INJECTION, SOLUTION PERINEURAL at 11:16

## 2025-05-15 NOTE — PROGRESS NOTES
[facility] - PODIATRY    Today's Date: 05/15/25    Patient Name: Marissa Brantley  MRN: [id number]  CSN: [id number]  PCP: Yary Michaels APRN,  Referring Provider: Yary Michaels APRN    SUBJECTIVE     Chief Complaint   Patient presents with    Right Foot - Pain, Establish Care     Pain in heel  x 1 yr   Saw PCP ordered xrays dx heel spurs and referred here. Pain gets worse throughout the day   also history of back issues with leg pain     HPI: Marissa Brantley, a 34 y.o.female, presents to clinic.    Patient presents with right heel pain.  Patient states has been going on for about a year.  It started when she started having right lower back pain.  Patient states since then that her heel pain has been progressive.  Patient states it hurts to walk.  It does hurt in the morning but it hurts throughout the day as well.  She has a long history of lower back nerve issues.    Past Medical History:   Diagnosis Date    ADD (attention deficit disorder)     Anxiety     Chicken pox     Cholelithiasis     Taken out    Depression     Fatigue     GERD without esophagitis     Headache     Hemorrhoids     Irregular menstruation     frequency and duration    Low back pain     Obesity     Osteoarthritis     primarily affecting the right wrist post orif    Rash and nonspecific skin eruption     Renal stones      Past Surgical History:   Procedure Laterality Date     SECTION      X2    CHOLECYSTECTOMY  2017    laparoscpopic    FRACTURE SURGERY  2009    TUBAL ABDOMINAL LIGATION  2023    Tubes removed    WRIST FRACTURE SURGERY Right 2009     Family History   Problem Relation Age of Onset    Diabetes type II Mother     Diabetes Mother     Heart disease Mother         She  may 4th 2024 cardiac arrest    Benign prostatic hyperplasia Father     Anxiety disorder Father     No Known Problems Sister     Hyperlipidemia Brother     Heart defect Daughter     Birth defects Daughter          "Cerebral palsy    Developmental Disability Daughter     Heart disease Maternal Grandfather     Heart disease Maternal Grandmother     Learning disabilities Daughter         A.d.d    Anxiety disorder Sister      Social History     Socioeconomic History    Marital status: Legally     Number of children: 2   Tobacco Use    Smoking status: Every Day     Current packs/day: 0.25     Average packs/day: 0.3 packs/day for 20.4 years (5.1 ttl pk-yrs)     Types: Cigarettes     Start date: 1/1/2005     Passive exposure: Never    Smokeless tobacco: Never   Vaping Use    Vaping status: Never Used   Substance and Sexual Activity    Alcohol use: Not Currently    Drug use: Not Currently     Types: Marijuana     Comment: \"like once a week\"    Sexual activity: Yes     Partners: Male     Birth control/protection: Tubal ligation     Allergies   Allergen Reactions    Ondansetron Hcl Dizziness     Makes pt dizzy    Robaxin [Methocarbamol] Other (See Comments)     Sleep walk     Current Outpatient Medications   Medication Sig Dispense Refill    ibuprofen (ADVIL,MOTRIN) 800 MG tablet Take 1 tablet by mouth As Needed.      buPROPion SR (Wellbutrin SR) 150 MG 12 hr tablet Take 1 tablet by mouth 2 (Two) Times a Day. (Patient not taking: Reported on 5/15/2025) 60 tablet 1    varenicline (Chantix Continuing Month Kojo) 1 MG tablet Take 1 tablet by mouth 2 (Two) Times a Day. (Patient not taking: Reported on 5/15/2025) 60 tablet 1     No current facility-administered medications for this visit.         OBJECTIVE     Vitals:    05/15/25 1106   BP: 119/67   Pulse: 98   SpO2: 100%       WBC   Date Value Ref Range Status   08/09/2024 10.71 3.40 - 10.80 10*3/mm3 Final   08/19/2023 14.14 (H) 4.5 - 11.0 10*3/uL Final     RBC   Date Value Ref Range Status   08/09/2024 3.25 (L) 3.77 - 5.28 10*6/mm3 Final   08/19/2023 3.53 (L) 4.0 - 5.2 10*6/uL Final     Hemoglobin   Date Value Ref Range Status   08/09/2024 8.7 (L) 12.0 - 15.9 g/dL Final "   08/19/2023 9.8 (L) 12.0 - 16.0 g/dL Final     Hematocrit   Date Value Ref Range Status   08/09/2024 28.6 (L) 34.0 - 46.6 % Final   08/19/2023 31.3 (L) 36.0 - 46.0 % Final     MCV   Date Value Ref Range Status   08/09/2024 88.0 79.0 - 97.0 fL Final   08/19/2023 88.7 80.0 - 100.0 fL Final     MCH   Date Value Ref Range Status   08/09/2024 26.8 26.6 - 33.0 pg Final   08/19/2023 27.8 26.0 - 34.0 pg Final     MCHC   Date Value Ref Range Status   08/09/2024 30.4 (L) 31.5 - 35.7 g/dL Final   08/19/2023 31.3 31.0 - 37.0 g/dL Final     RDW   Date Value Ref Range Status   08/09/2024 14.0 12.3 - 15.4 % Final   08/19/2023 14.8 12.0 - 16.8 % Final     RDW-SD   Date Value Ref Range Status   08/09/2024 45.4 37.0 - 54.0 fl Final     MPV   Date Value Ref Range Status   08/09/2024 10.8 6.0 - 12.0 fL Final   08/19/2023 12.0 8.4 - 12.4 fL Final     Platelets   Date Value Ref Range Status   08/09/2024 277 140 - 450 10*3/mm3 Final   08/19/2023 171 140 - 440 10*3/uL Final     Neutrophil Rel %   Date Value Ref Range Status   08/19/2023 74.2 45 - 80 % Final     Neutrophil %   Date Value Ref Range Status   08/09/2024 63.8 42.7 - 76.0 % Final     Lymphocyte Rel %   Date Value Ref Range Status   08/19/2023 18.1 15 - 50 % Final     Lymphocyte %   Date Value Ref Range Status   08/09/2024 28.1 19.6 - 45.3 % Final     Monocyte Rel %   Date Value Ref Range Status   08/19/2023 5.7 0 - 15 % Final     Monocyte %   Date Value Ref Range Status   08/09/2024 4.6 (L) 5.0 - 12.0 % Final     Eosinophil %   Date Value Ref Range Status   08/09/2024 2.6 0.3 - 6.2 % Final   08/19/2023 1.0 0 - 7 % Final     Basophil Rel %   Date Value Ref Range Status   08/19/2023 0.4 0 - 2 % Final     Basophil %   Date Value Ref Range Status   08/09/2024 0.8 0.0 - 1.5 % Final     Immature Grans %   Date Value Ref Range Status   08/09/2024 0.1 0.0 - 0.5 % Final   08/19/2023 0.6 0.0 - 1.0 % Final     Neutrophils Absolute   Date Value Ref Range Status   08/19/2023 10.50 (H) 2.0 -  8.8 10*3/uL Final     Neutrophils, Absolute   Date Value Ref Range Status   08/09/2024 6.83 1.70 - 7.00 10*3/mm3 Final     Lymphocytes Absolute   Date Value Ref Range Status   08/19/2023 2.56 0.7 - 5.5 10*3/uL Final     Lymphocytes, Absolute   Date Value Ref Range Status   08/09/2024 3.01 0.70 - 3.10 10*3/mm3 Final     Monocytes Absolute   Date Value Ref Range Status   08/19/2023 0.80 0.0 - 1.7 10*3/uL Final     Monocytes, Absolute   Date Value Ref Range Status   08/09/2024 0.49 0.10 - 0.90 10*3/mm3 Final     Eosinophils Absolute   Date Value Ref Range Status   08/19/2023 0.14 0.0 - 0.8 10*3/uL Final     Eosinophils, Absolute   Date Value Ref Range Status   08/09/2024 0.28 0.00 - 0.40 10*3/mm3 Final     Basophils Absolute   Date Value Ref Range Status   08/19/2023 0.05 0.0 - 0.2 10*3/uL Final     Basophils, Absolute   Date Value Ref Range Status   08/09/2024 0.09 0.00 - 0.20 10*3/mm3 Final     Immature Grans, Absolute   Date Value Ref Range Status   08/09/2024 0.01 0.00 - 0.05 10*3/mm3 Final   08/19/2023 0.09 0.00 - 0.10 10*3/uL Final     nRBC   Date Value Ref Range Status   08/19/2023 0 0 /100(WBC) Final         Lab Results   Component Value Date    GLUCOSE 87 08/09/2024    BUN 10 08/09/2024    CREATININE 0.76 08/09/2024    BCR 13.2 08/09/2024    K 4.0 08/09/2024    CO2 22.0 08/09/2024    CALCIUM 9.1 08/09/2024    ALBUMIN 4.3 08/09/2024    AST 9 08/09/2024    ALT 11 08/09/2024       Patient seen in no apparent distress.      PHYSICAL EXAM:     Foot/Ankle Exam    GENERAL  Appearance:  appears stated age  Orientation:  AAOx3  Affect:  appropriate  Gait:  unimpaired  Assistance:  independent  Right shoe gear: casual shoe  Left shoe gear: casual shoe    VASCULAR     Right Foot Vascularity   Normal vascular exam    Dorsalis pedis:  2+  Posterior tibial:  2+  Skin temperature:  warm  Edema grading:  None  CFT:  < 3 seconds  Pedal hair growth:  Present  Varicosities:  none     Left Foot Vascularity   Normal vascular exam     Dorsalis pedis:  2+  Posterior tibial:  2+  Skin temperature:  warm  Edema grading:  None  CFT:  < 3 seconds  Pedal hair growth:  Present  Varicosities:  none     NEUROLOGIC     Right Foot Neurologic   Normal sensation    Light touch sensation: normal  Vibratory sensation: normal  Hot/Cold sensation: normal     Left Foot Neurologic   Normal sensation    Light touch sensation: normal  Vibratory sensation: normal  Hot/Cold sensation:  normal    MUSCULOSKELETAL     Right Foot Musculoskeletal   Ecchymosis:  none  Tenderness:  plantar fascia tenderness      MUSCLE STRENGTH     Right Foot Muscle Strength   Foot dorsiflexion:  4  Foot plantar flexion:  4  Foot inversion:  4  Foot eversion:  4     Left Foot Muscle Strength   Foot dorsiflexion:  4  Foot plantar flexion:  4  Foot inversion:  4  Foot eversion:  4    RANGE OF MOTION     Right Foot Range of Motion   Foot and ankle ROM within normal limits       Left Foot Range of Motion   Foot and ankle ROM within normal limits      DERMATOLOGIC      Right Foot Dermatologic   Skin  Right foot skin is intact.   Nails comment:  Toenails 1, 2, 3, 4, and 5     Left Foot Dermatologic   Skin  Left foot skin is intact.   Nails comment:  Toenails 1, 2, 3, 4, and 5      RADIOLOGY:          No results found.    ASSESSMENT/PLAN     Diagnoses and all orders for this visit:    1. Plantar fasciitis (Primary)    2. Lumbosacral radiculopathy        Comprehensive lower extremity examination and evaluation was performed.    Assessment & Plan    Patient to begin stretching exercises and icing in the evening as tolerated. Discussed compression therapy and resting the extremity.  Anti-inflammatory medication to begin taking if okay by PCP.     Injection  Date/Time: 05/15/2025  Performed by: Jaden Henao DPM  Authorized by: Jaden Henao DPM     Consent: Verbal consent obtained. Written consent obtained.  Risks and benefits: risks, benefits and alternatives were discussed  Consent given  by: patient  Patient identity confirmed: verbally with patient  Indications: pain relief    Betadine prep x 3 to the planned injection site.    Injection site: Right heel    Sedation:  Patient sedated: no    Patient position: sitting  Needle size: 27 G  Local anesthetic: 1.0 mL 0.25% Marcaine plain and 1.0 mL triamcinolone.   Outcome: pain improved  Patient tolerance: Patient tolerated the procedure well with no immediate complications      Discussed findings and treatment plan including risks, benefits, and treatment options with patient in detail. Patient agreed with treatment plan.    Medications and allergies reviewed.  Reviewed available lab values along with other pertinent labs.  These were discussed with the patient.    All questions were answered to patient/family satisfaction. Should symptoms fail to improve or worsen they agree to call or return to clinic or to go to the Emergency Department. Discussed the importance of following up with any needed screening tests/labs/specialist appointments and any requested follow-up recommended by me today. Importance of maintaining follow-up discussed and patient accepts that missed appointments can delay diagnosis and potentially lead to worsening of conditions.    Return in about 2 months (around 7/15/2025)., or sooner if acute issues arise.    Patient or patient representative verbalized consent for the use of Ambient Listening during the visit with  Jaden Henao DPM for chart documentation. 5/15/2025  12:25 EDT    This document has been electronically signed by Jaden Henao DPM on May 15, 2025 12:25 EDT

## 2025-07-17 ENCOUNTER — OFFICE VISIT (OUTPATIENT)
Dept: PODIATRY | Facility: CLINIC | Age: 35
End: 2025-07-17
Payer: COMMERCIAL

## 2025-07-17 VITALS
HEART RATE: 79 BPM | SYSTOLIC BLOOD PRESSURE: 122 MMHG | WEIGHT: 231 LBS | BODY MASS INDEX: 38.49 KG/M2 | OXYGEN SATURATION: 99 % | HEIGHT: 65 IN | DIASTOLIC BLOOD PRESSURE: 70 MMHG

## 2025-07-17 DIAGNOSIS — M54.17 LUMBOSACRAL RADICULOPATHY: Primary | ICD-10-CM

## 2025-07-17 RX ORDER — METHYLPREDNISOLONE 4 MG/1
TABLET ORAL
Qty: 21 TABLET | Refills: 0 | Status: SHIPPED | OUTPATIENT
Start: 2025-07-17

## 2025-07-17 NOTE — PROGRESS NOTES
[facility] - PODIATRY    Today's Date: 25    Patient Name: Marissa Brantley  MRN: [id number]  CSN: [id number]  PCP: Yary Michaels APRN,  Referring Provider: No ref. provider found    SUBJECTIVE     Chief Complaint   Patient presents with    Right Foot - Pain, Follow-up     States after injection numbness and pain radiated up the whole leg to the hip also to back     HPI: Marissa Brantley, a 34 y.o.female, presents to clinic.    Patient presents with right heel pain.  Patient states has been going on for about a year.  It started when she started having right lower back pain.  Patient states since then that her heel pain has been progressive.  Patient states it hurts to walk.  It does hurt in the morning but it hurts throughout the day as well.  She has a long history of lower back nerve issues.    2025-patient is here for follow-up.  States that heel has improved some but she now has numbness going from her hip down to her foot.  She also has lower back pain.    Past Medical History:   Diagnosis Date    ADD (attention deficit disorder)     Anxiety     Chicken pox     Cholelithiasis     Taken out    Depression     Fatigue     GERD without esophagitis     Headache     Hemorrhoids     Irregular menstruation     frequency and duration    Low back pain     Obesity     Osteoarthritis     primarily affecting the right wrist post orif    Rash and nonspecific skin eruption     Renal stones      Past Surgical History:   Procedure Laterality Date     SECTION      X2    CHOLECYSTECTOMY  2017    laparoscpopic    FRACTURE SURGERY  2009    TUBAL ABDOMINAL LIGATION  2023    Tubes removed    WRIST FRACTURE SURGERY Right 2009     Family History   Problem Relation Age of Onset    Diabetes type II Mother     Diabetes Mother     Heart disease Mother         She  may 4th 2024 cardiac arrest    Benign prostatic hyperplasia Father     Anxiety disorder Father     No Known  "Problems Sister     Hyperlipidemia Brother     Heart defect Daughter     Birth defects Daughter         Cerebral palsy    Developmental Disability Daughter     Heart disease Maternal Grandfather     Heart disease Maternal Grandmother     Learning disabilities Daughter         A.d.d    Anxiety disorder Sister      Social History     Socioeconomic History    Marital status:     Number of children: 2   Tobacco Use    Smoking status: Every Day     Current packs/day: 0.25     Average packs/day: 0.3 packs/day for 20.5 years (5.1 ttl pk-yrs)     Types: Cigarettes     Start date: 1/1/2005     Passive exposure: Never    Smokeless tobacco: Never   Vaping Use    Vaping status: Never Used   Substance and Sexual Activity    Alcohol use: Not Currently    Drug use: Not Currently     Types: Marijuana     Comment: \"like once a week\"    Sexual activity: Yes     Partners: Male     Birth control/protection: Tubal ligation     Allergies   Allergen Reactions    Ondansetron Hcl Dizziness     Makes pt dizzy    Robaxin [Methocarbamol] Other (See Comments)     Sleep walk     Current Outpatient Medications   Medication Sig Dispense Refill    ibuprofen (ADVIL,MOTRIN) 800 MG tablet Take 1 tablet by mouth As Needed.      buPROPion SR (Wellbutrin SR) 150 MG 12 hr tablet Take 1 tablet by mouth 2 (Two) Times a Day. (Patient not taking: Reported on 7/17/2025) 60 tablet 1    methylPREDNISolone (MEDROL) 4 MG dose pack Take as directed 21 tablet 0    varenicline (Chantix Continuing Month Kojo) 1 MG tablet Take 1 tablet by mouth 2 (Two) Times a Day. (Patient not taking: Reported on 7/17/2025) 60 tablet 1     No current facility-administered medications for this visit.         OBJECTIVE     Vitals:    07/17/25 1051   BP: 122/70   Pulse: 79   SpO2: 99%       WBC   Date Value Ref Range Status   08/09/2024 10.71 3.40 - 10.80 10*3/mm3 Final   08/19/2023 14.14 (H) 4.5 - 11.0 10*3/uL Final     RBC   Date Value Ref Range Status   08/09/2024 3.25 (L) 3.77 " - 5.28 10*6/mm3 Final   08/19/2023 3.53 (L) 4.0 - 5.2 10*6/uL Final     Hemoglobin   Date Value Ref Range Status   08/09/2024 8.7 (L) 12.0 - 15.9 g/dL Final   08/19/2023 9.8 (L) 12.0 - 16.0 g/dL Final     Hematocrit   Date Value Ref Range Status   08/09/2024 28.6 (L) 34.0 - 46.6 % Final   08/19/2023 31.3 (L) 36.0 - 46.0 % Final     MCV   Date Value Ref Range Status   08/09/2024 88.0 79.0 - 97.0 fL Final   08/19/2023 88.7 80.0 - 100.0 fL Final     MCH   Date Value Ref Range Status   08/09/2024 26.8 26.6 - 33.0 pg Final   08/19/2023 27.8 26.0 - 34.0 pg Final     MCHC   Date Value Ref Range Status   08/09/2024 30.4 (L) 31.5 - 35.7 g/dL Final   08/19/2023 31.3 31.0 - 37.0 g/dL Final     RDW   Date Value Ref Range Status   08/09/2024 14.0 12.3 - 15.4 % Final   08/19/2023 14.8 12.0 - 16.8 % Final     RDW-SD   Date Value Ref Range Status   08/09/2024 45.4 37.0 - 54.0 fl Final     MPV   Date Value Ref Range Status   08/09/2024 10.8 6.0 - 12.0 fL Final   08/19/2023 12.0 8.4 - 12.4 fL Final     Platelets   Date Value Ref Range Status   08/09/2024 277 140 - 450 10*3/mm3 Final   08/19/2023 171 140 - 440 10*3/uL Final     Neutrophil Rel %   Date Value Ref Range Status   08/19/2023 74.2 45 - 80 % Final     Neutrophil %   Date Value Ref Range Status   08/09/2024 63.8 42.7 - 76.0 % Final     Lymphocyte Rel %   Date Value Ref Range Status   08/19/2023 18.1 15 - 50 % Final     Lymphocyte %   Date Value Ref Range Status   08/09/2024 28.1 19.6 - 45.3 % Final     Monocyte Rel %   Date Value Ref Range Status   08/19/2023 5.7 0 - 15 % Final     Monocyte %   Date Value Ref Range Status   08/09/2024 4.6 (L) 5.0 - 12.0 % Final     Eosinophil %   Date Value Ref Range Status   08/09/2024 2.6 0.3 - 6.2 % Final   08/19/2023 1.0 0 - 7 % Final     Basophil Rel %   Date Value Ref Range Status   08/19/2023 0.4 0 - 2 % Final     Basophil %   Date Value Ref Range Status   08/09/2024 0.8 0.0 - 1.5 % Final     Immature Grans %   Date Value Ref Range  Status   08/09/2024 0.1 0.0 - 0.5 % Final   08/19/2023 0.6 0.0 - 1.0 % Final     Neutrophils Absolute   Date Value Ref Range Status   08/19/2023 10.50 (H) 2.0 - 8.8 10*3/uL Final     Neutrophils, Absolute   Date Value Ref Range Status   08/09/2024 6.83 1.70 - 7.00 10*3/mm3 Final     Lymphocytes Absolute   Date Value Ref Range Status   08/19/2023 2.56 0.7 - 5.5 10*3/uL Final     Lymphocytes, Absolute   Date Value Ref Range Status   08/09/2024 3.01 0.70 - 3.10 10*3/mm3 Final     Monocytes Absolute   Date Value Ref Range Status   08/19/2023 0.80 0.0 - 1.7 10*3/uL Final     Monocytes, Absolute   Date Value Ref Range Status   08/09/2024 0.49 0.10 - 0.90 10*3/mm3 Final     Eosinophils Absolute   Date Value Ref Range Status   08/19/2023 0.14 0.0 - 0.8 10*3/uL Final     Eosinophils, Absolute   Date Value Ref Range Status   08/09/2024 0.28 0.00 - 0.40 10*3/mm3 Final     Basophils Absolute   Date Value Ref Range Status   08/19/2023 0.05 0.0 - 0.2 10*3/uL Final     Basophils, Absolute   Date Value Ref Range Status   08/09/2024 0.09 0.00 - 0.20 10*3/mm3 Final     Immature Grans, Absolute   Date Value Ref Range Status   08/09/2024 0.01 0.00 - 0.05 10*3/mm3 Final   08/19/2023 0.09 0.00 - 0.10 10*3/uL Final     nRBC   Date Value Ref Range Status   08/19/2023 0 0 /100(WBC) Final         Lab Results   Component Value Date    GLUCOSE 87 08/09/2024    BUN 10 08/09/2024    CREATININE 0.76 08/09/2024    BCR 13.2 08/09/2024    K 4.0 08/09/2024    CO2 22.0 08/09/2024    CALCIUM 9.1 08/09/2024    ALBUMIN 4.3 08/09/2024    AST 9 08/09/2024    ALT 11 08/09/2024       Patient seen in no apparent distress.      PHYSICAL EXAM:     Foot/Ankle Exam    GENERAL  Appearance:  appears stated age  Orientation:  AAOx3  Affect:  appropriate  Gait:  unimpaired  Assistance:  independent  Right shoe gear: casual shoe  Left shoe gear: casual shoe    VASCULAR     Right Foot Vascularity   Normal vascular exam    Dorsalis pedis:  2+  Posterior tibial:   2+  Skin temperature:  warm  Edema grading:  None  CFT:  < 3 seconds  Pedal hair growth:  Present  Varicosities:  none     Left Foot Vascularity   Normal vascular exam    Dorsalis pedis:  2+  Posterior tibial:  2+  Skin temperature:  warm  Edema grading:  None  CFT:  < 3 seconds  Pedal hair growth:  Present  Varicosities:  none     NEUROLOGIC     Right Foot Neurologic   Normal sensation    Light touch sensation: normal  Vibratory sensation: normal  Hot/Cold sensation: normal     Left Foot Neurologic   Normal sensation    Light touch sensation: normal  Vibratory sensation: normal  Hot/Cold sensation:  normal    MUSCULOSKELETAL     Right Foot Musculoskeletal   Ecchymosis:  none  Tenderness:  plantar fascia tenderness      MUSCLE STRENGTH     Right Foot Muscle Strength   Foot dorsiflexion:  4  Foot plantar flexion:  4  Foot inversion:  4  Foot eversion:  4     Left Foot Muscle Strength   Foot dorsiflexion:  4  Foot plantar flexion:  4  Foot inversion:  4  Foot eversion:  4    RANGE OF MOTION     Right Foot Range of Motion   Foot and ankle ROM within normal limits       Left Foot Range of Motion   Foot and ankle ROM within normal limits      DERMATOLOGIC      Right Foot Dermatologic   Skin  Right foot skin is intact.   Nails comment:  Toenails 1, 2, 3, 4, and 5     Left Foot Dermatologic   Skin  Left foot skin is intact.   Nails comment:  Toenails 1, 2, 3, 4, and 5      RADIOLOGY:          No results found.    ASSESSMENT/PLAN     Diagnoses and all orders for this visit:    1. Lumbosacral radiculopathy (Primary)  -     Cancel: EMG & Nerve Conduction Test; Future  -     EMG & Nerve Conduction Test; Future    Other orders  -     methylPREDNISolone (MEDROL) 4 MG dose pack; Take as directed  Dispense: 21 tablet; Refill: 0        Comprehensive lower extremity examination and evaluation was performed.    Assessment & Plan    EMG nerve conduction study ordered.  Return to clinic following study.  Discussed symptoms are likely  related to her lower back.    Discussed findings and treatment plan including risks, benefits, and treatment options with patient in detail. Patient agreed with treatment plan.    Medications and allergies reviewed.  Reviewed available lab values along with other pertinent labs.  These were discussed with the patient.    All questions were answered to patient/family satisfaction. Should symptoms fail to improve or worsen they agree to call or return to clinic or to go to the Emergency Department. Discussed the importance of following up with any needed screening tests/labs/specialist appointments and any requested follow-up recommended by me today. Importance of maintaining follow-up discussed and patient accepts that missed appointments can delay diagnosis and potentially lead to worsening of conditions.    Return in about 3 months (around 10/17/2025)., or sooner if acute issues arise.    Patient or patient representative verbalized consent for the use of Ambient Listening during the visit with  Jaden Henao DPM for chart documentation. 7/17/2025  12:25 EDT    This document has been electronically signed by Jaden Henao DPM on July 17, 2025 12:25 EDT

## 2025-07-31 ENCOUNTER — HOSPITAL ENCOUNTER (OUTPATIENT)
Facility: HOSPITAL | Age: 35
Discharge: HOME | End: 2025-07-31
Admitting: PODIATRIST
Payer: COMMERCIAL

## 2025-07-31 ENCOUNTER — HOSPITAL ENCOUNTER (EMERGENCY)
Facility: HOSPITAL | Age: 35
Discharge: STILL ADMITTED OR ADMINISTRATIVE | End: 2025-07-31
Payer: COMMERCIAL

## 2025-07-31 DIAGNOSIS — M54.17 LUMBOSACRAL RADICULOPATHY: ICD-10-CM

## 2025-07-31 PROCEDURE — 95908 NRV CNDJ TST 3-4 STUDIES: CPT

## 2025-07-31 PROCEDURE — 95886 MUSC TEST DONE W/N TEST COMP: CPT

## 2025-08-18 ENCOUNTER — HOSPITAL ENCOUNTER (OUTPATIENT)
Dept: GENERAL RADIOLOGY | Facility: HOSPITAL | Age: 35
Discharge: HOME | End: 2025-08-18
Payer: COMMERCIAL

## 2025-08-18 ENCOUNTER — OFFICE VISIT (OUTPATIENT)
Dept: FAMILY MEDICINE CLINIC | Age: 35
End: 2025-08-18
Payer: COMMERCIAL

## 2025-08-18 VITALS
WEIGHT: 230.6 LBS | SYSTOLIC BLOOD PRESSURE: 118 MMHG | HEART RATE: 90 BPM | BODY MASS INDEX: 38.42 KG/M2 | DIASTOLIC BLOOD PRESSURE: 88 MMHG | TEMPERATURE: 98 F | OXYGEN SATURATION: 96 % | HEIGHT: 65 IN

## 2025-08-18 DIAGNOSIS — G89.29 CHRONIC RIGHT-SIDED LOW BACK PAIN WITH RIGHT-SIDED SCIATICA: ICD-10-CM

## 2025-08-18 DIAGNOSIS — M25.551 RIGHT HIP PAIN: ICD-10-CM

## 2025-08-18 DIAGNOSIS — G89.29 CHRONIC RIGHT-SIDED LOW BACK PAIN WITH RIGHT-SIDED SCIATICA: Primary | ICD-10-CM

## 2025-08-18 DIAGNOSIS — M54.41 CHRONIC RIGHT-SIDED LOW BACK PAIN WITH RIGHT-SIDED SCIATICA: ICD-10-CM

## 2025-08-18 DIAGNOSIS — M54.41 CHRONIC RIGHT-SIDED LOW BACK PAIN WITH RIGHT-SIDED SCIATICA: Primary | ICD-10-CM

## 2025-08-18 PROCEDURE — 72100 X-RAY EXAM L-S SPINE 2/3 VWS: CPT

## 2025-08-18 PROCEDURE — 1160F RVW MEDS BY RX/DR IN RCRD: CPT

## 2025-08-18 PROCEDURE — 1159F MED LIST DOCD IN RCRD: CPT

## 2025-08-18 PROCEDURE — 99213 OFFICE O/P EST LOW 20 MIN: CPT

## 2025-08-18 PROCEDURE — 73502 X-RAY EXAM HIP UNI 2-3 VIEWS: CPT

## 2025-08-18 PROCEDURE — 1125F AMNT PAIN NOTED PAIN PRSNT: CPT

## 2025-08-18 RX ORDER — TIZANIDINE 2 MG/1
2 TABLET ORAL 2 TIMES DAILY PRN
Qty: 10 TABLET | Refills: 0 | Status: SHIPPED | OUTPATIENT
Start: 2025-08-18

## 2025-08-25 ENCOUNTER — RESULTS FOLLOW-UP (OUTPATIENT)
Dept: FAMILY MEDICINE CLINIC | Age: 35
End: 2025-08-25
Payer: COMMERCIAL